# Patient Record
Sex: FEMALE | Race: WHITE | NOT HISPANIC OR LATINO | Employment: OTHER | URBAN - METROPOLITAN AREA
[De-identification: names, ages, dates, MRNs, and addresses within clinical notes are randomized per-mention and may not be internally consistent; named-entity substitution may affect disease eponyms.]

---

## 2020-12-04 ENCOUNTER — APPOINTMENT (EMERGENCY)
Dept: RADIOLOGY | Facility: HOSPITAL | Age: 85
DRG: 177 | End: 2020-12-04
Payer: MEDICARE

## 2020-12-04 ENCOUNTER — HOSPITAL ENCOUNTER (INPATIENT)
Facility: HOSPITAL | Age: 85
LOS: 4 days | Discharge: HOME HEALTH | DRG: 177 | End: 2020-12-08
Attending: EMERGENCY MEDICINE | Admitting: INTERNAL MEDICINE
Payer: MEDICARE
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
## 2020-12-04 DIAGNOSIS — U07.1 COVID-19: ICD-10-CM

## 2020-12-04 DIAGNOSIS — I10 ESSENTIAL HYPERTENSION: ICD-10-CM

## 2020-12-04 DIAGNOSIS — S80.212A ABRASION OF KNEE, BILATERAL: ICD-10-CM

## 2020-12-04 DIAGNOSIS — R26.2 AMBULATORY DYSFUNCTION: ICD-10-CM

## 2020-12-04 DIAGNOSIS — U07.1 LAB TEST POSITIVE FOR DETECTION OF COVID-19 VIRUS: ICD-10-CM

## 2020-12-04 DIAGNOSIS — S80.211A ABRASION OF KNEE, BILATERAL: ICD-10-CM

## 2020-12-04 DIAGNOSIS — R53.1 GENERALIZED WEAKNESS: Primary | ICD-10-CM

## 2020-12-04 DIAGNOSIS — G93.89 CEREBELLAR MASS: ICD-10-CM

## 2020-12-04 LAB
ALBUMIN SERPL BCP-MCNC: 4.4 G/DL (ref 3.5–5)
ALP SERPL-CCNC: 90 U/L (ref 46–116)
ALT SERPL W P-5'-P-CCNC: 28 U/L (ref 12–78)
ANION GAP SERPL CALCULATED.3IONS-SCNC: 10 MMOL/L (ref 4–13)
AST SERPL W P-5'-P-CCNC: 50 U/L (ref 5–45)
BASOPHILS # BLD AUTO: 0.03 THOUSANDS/ΜL (ref 0–0.1)
BASOPHILS NFR BLD AUTO: 0 % (ref 0–1)
BILIRUB SERPL-MCNC: 0.72 MG/DL (ref 0.2–1)
BUN SERPL-MCNC: 15 MG/DL (ref 5–25)
CALCIUM SERPL-MCNC: 8.9 MG/DL (ref 8.3–10.1)
CHLORIDE SERPL-SCNC: 99 MMOL/L (ref 100–108)
CK MB SERPL-MCNC: 4.3 NG/ML (ref 0–5)
CK MB SERPL-MCNC: <1 % (ref 0–2.5)
CK SERPL-CCNC: 874 U/L (ref 26–192)
CO2 SERPL-SCNC: 26 MMOL/L (ref 21–32)
CREAT SERPL-MCNC: 1.03 MG/DL (ref 0.6–1.3)
EOSINOPHIL # BLD AUTO: 0 THOUSAND/ΜL (ref 0–0.61)
EOSINOPHIL NFR BLD AUTO: 0 % (ref 0–6)
ERYTHROCYTE [DISTWIDTH] IN BLOOD BY AUTOMATED COUNT: 13.2 % (ref 11.6–15.1)
FLUAV RNA RESP QL NAA+PROBE: NEGATIVE
FLUBV RNA RESP QL NAA+PROBE: NEGATIVE
GFR SERPL CREATININE-BSD FRML MDRD: 48 ML/MIN/1.73SQ M
GLUCOSE SERPL-MCNC: 113 MG/DL (ref 65–140)
HCT VFR BLD AUTO: 44.7 % (ref 34.8–46.1)
HGB BLD-MCNC: 14.4 G/DL (ref 11.5–15.4)
IMM GRANULOCYTES # BLD AUTO: 0.03 THOUSAND/UL (ref 0–0.2)
IMM GRANULOCYTES NFR BLD AUTO: 0 % (ref 0–2)
LYMPHOCYTES # BLD AUTO: 0.3 THOUSANDS/ΜL (ref 0.6–4.47)
LYMPHOCYTES NFR BLD AUTO: 4 % (ref 14–44)
MCH RBC QN AUTO: 30.6 PG (ref 26.8–34.3)
MCHC RBC AUTO-ENTMCNC: 32.2 G/DL (ref 31.4–37.4)
MCV RBC AUTO: 95 FL (ref 82–98)
MONOCYTES # BLD AUTO: 0.49 THOUSAND/ΜL (ref 0.17–1.22)
MONOCYTES NFR BLD AUTO: 7 % (ref 4–12)
NEUTROPHILS # BLD AUTO: 6.16 THOUSANDS/ΜL (ref 1.85–7.62)
NEUTS SEG NFR BLD AUTO: 89 % (ref 43–75)
NRBC BLD AUTO-RTO: 0 /100 WBCS
PLATELET # BLD AUTO: 200 THOUSANDS/UL (ref 149–390)
PMV BLD AUTO: 10.1 FL (ref 8.9–12.7)
POTASSIUM SERPL-SCNC: 3.9 MMOL/L (ref 3.5–5.3)
PROT SERPL-MCNC: 8.5 G/DL (ref 6.4–8.2)
RBC # BLD AUTO: 4.71 MILLION/UL (ref 3.81–5.12)
RSV RNA RESP QL NAA+PROBE: NEGATIVE
SARS-COV-2 RNA RESP QL NAA+PROBE: POSITIVE
SODIUM SERPL-SCNC: 135 MMOL/L (ref 136–145)
WBC # BLD AUTO: 7.01 THOUSAND/UL (ref 4.31–10.16)

## 2020-12-04 PROCEDURE — 93005 ELECTROCARDIOGRAM TRACING: CPT

## 2020-12-04 PROCEDURE — 36415 COLL VENOUS BLD VENIPUNCTURE: CPT | Performed by: EMERGENCY MEDICINE

## 2020-12-04 PROCEDURE — 84484 ASSAY OF TROPONIN QUANT: CPT | Performed by: PHYSICIAN ASSISTANT

## 2020-12-04 PROCEDURE — 86900 BLOOD TYPING SEROLOGIC ABO: CPT | Performed by: PHYSICIAN ASSISTANT

## 2020-12-04 PROCEDURE — 85025 COMPLETE CBC W/AUTO DIFF WBC: CPT | Performed by: EMERGENCY MEDICINE

## 2020-12-04 PROCEDURE — 83520 IMMUNOASSAY QUANT NOS NONAB: CPT | Performed by: PHYSICIAN ASSISTANT

## 2020-12-04 PROCEDURE — 71045 X-RAY EXAM CHEST 1 VIEW: CPT

## 2020-12-04 PROCEDURE — 99285 EMERGENCY DEPT VISIT HI MDM: CPT | Performed by: EMERGENCY MEDICINE

## 2020-12-04 PROCEDURE — 86901 BLOOD TYPING SEROLOGIC RH(D): CPT | Performed by: PHYSICIAN ASSISTANT

## 2020-12-04 PROCEDURE — 82550 ASSAY OF CK (CPK): CPT | Performed by: EMERGENCY MEDICINE

## 2020-12-04 PROCEDURE — 80053 COMPREHEN METABOLIC PANEL: CPT | Performed by: EMERGENCY MEDICINE

## 2020-12-04 PROCEDURE — 99285 EMERGENCY DEPT VISIT HI MDM: CPT

## 2020-12-04 PROCEDURE — 82553 CREATINE MB FRACTION: CPT | Performed by: EMERGENCY MEDICINE

## 2020-12-04 PROCEDURE — 0241U HB NFCT DS VIR RESP RNA 4 TRGT: CPT | Performed by: EMERGENCY MEDICINE

## 2020-12-04 PROCEDURE — 85049 AUTOMATED PLATELET COUNT: CPT | Performed by: PHYSICIAN ASSISTANT

## 2020-12-04 PROCEDURE — 99222 1ST HOSP IP/OBS MODERATE 55: CPT | Performed by: PHYSICIAN ASSISTANT

## 2020-12-04 RX ORDER — MULTIVIT-MIN/FERROUS GLUCONATE 9 MG/15 ML
15 LIQUID (ML) ORAL DAILY
Status: DISCONTINUED | OUTPATIENT
Start: 2020-12-12 | End: 2020-12-08 | Stop reason: HOSPADM

## 2020-12-04 RX ORDER — SODIUM CHLORIDE 9 MG/ML
75 INJECTION, SOLUTION INTRAVENOUS CONTINUOUS
Status: DISCONTINUED | OUTPATIENT
Start: 2020-12-04 | End: 2020-12-06

## 2020-12-04 RX ORDER — MELATONIN
2000 DAILY
Status: DISCONTINUED | OUTPATIENT
Start: 2020-12-05 | End: 2020-12-08 | Stop reason: HOSPADM

## 2020-12-04 RX ORDER — ACETAMINOPHEN 325 MG/1
650 TABLET ORAL EVERY 6 HOURS PRN
Status: DISCONTINUED | OUTPATIENT
Start: 2020-12-04 | End: 2020-12-08 | Stop reason: HOSPADM

## 2020-12-04 RX ORDER — ASCORBIC ACID 500 MG
1000 TABLET ORAL EVERY 12 HOURS SCHEDULED
Status: DISCONTINUED | OUTPATIENT
Start: 2020-12-04 | End: 2020-12-08 | Stop reason: HOSPADM

## 2020-12-04 RX ORDER — BENZONATATE 100 MG/1
100 CAPSULE ORAL ONCE
Status: COMPLETED | OUTPATIENT
Start: 2020-12-04 | End: 2020-12-04

## 2020-12-04 RX ORDER — SODIUM CHLORIDE, SODIUM LACTATE, POTASSIUM CHLORIDE, CALCIUM CHLORIDE 600; 310; 30; 20 MG/100ML; MG/100ML; MG/100ML; MG/100ML
150 INJECTION, SOLUTION INTRAVENOUS CONTINUOUS
Status: DISCONTINUED | OUTPATIENT
Start: 2020-12-04 | End: 2020-12-04

## 2020-12-04 RX ORDER — CALCIUM CARBONATE 200(500)MG
1000 TABLET,CHEWABLE ORAL DAILY PRN
Status: DISCONTINUED | OUTPATIENT
Start: 2020-12-04 | End: 2020-12-08 | Stop reason: HOSPADM

## 2020-12-04 RX ORDER — ZINC SULFATE 50(220)MG
220 CAPSULE ORAL DAILY
Status: DISCONTINUED | OUTPATIENT
Start: 2020-12-05 | End: 2020-12-08 | Stop reason: HOSPADM

## 2020-12-04 RX ADMIN — BENZONATATE 100 MG: 100 CAPSULE ORAL at 23:44

## 2020-12-04 RX ADMIN — SODIUM CHLORIDE 75 ML/HR: 0.9 INJECTION, SOLUTION INTRAVENOUS at 23:44

## 2020-12-05 ENCOUNTER — APPOINTMENT (INPATIENT)
Dept: CT IMAGING | Facility: HOSPITAL | Age: 85
DRG: 177 | End: 2020-12-05
Payer: MEDICARE

## 2020-12-05 LAB
ABO GROUP BLD: NORMAL
ALBUMIN SERPL BCP-MCNC: 3.7 G/DL (ref 3.5–5)
ALP SERPL-CCNC: 77 U/L (ref 46–116)
ALT SERPL W P-5'-P-CCNC: 27 U/L (ref 12–78)
ANION GAP SERPL CALCULATED.3IONS-SCNC: 8 MMOL/L (ref 4–13)
AST SERPL W P-5'-P-CCNC: 45 U/L (ref 5–45)
BASOPHILS # BLD AUTO: 0.01 THOUSANDS/ΜL (ref 0–0.1)
BASOPHILS NFR BLD AUTO: 0 % (ref 0–1)
BILIRUB SERPL-MCNC: 0.67 MG/DL (ref 0.2–1)
BUN SERPL-MCNC: 16 MG/DL (ref 5–25)
CALCIUM SERPL-MCNC: 8.4 MG/DL (ref 8.3–10.1)
CHLORIDE SERPL-SCNC: 102 MMOL/L (ref 100–108)
CK MB SERPL-MCNC: 4.8 NG/ML (ref 0–5)
CK MB SERPL-MCNC: <1 % (ref 0–2.5)
CK SERPL-CCNC: 654 U/L (ref 26–192)
CO2 SERPL-SCNC: 27 MMOL/L (ref 21–32)
CREAT SERPL-MCNC: 1.03 MG/DL (ref 0.6–1.3)
CRP SERPL QL: 32.2 MG/L
D DIMER PPP FEU-MCNC: 1.25 UG/ML FEU
EOSINOPHIL # BLD AUTO: 0.02 THOUSAND/ΜL (ref 0–0.61)
EOSINOPHIL NFR BLD AUTO: 0 % (ref 0–6)
ERYTHROCYTE [DISTWIDTH] IN BLOOD BY AUTOMATED COUNT: 13.4 % (ref 11.6–15.1)
FERRITIN SERPL-MCNC: 273 NG/ML (ref 8–388)
GFR SERPL CREATININE-BSD FRML MDRD: 48 ML/MIN/1.73SQ M
GLUCOSE SERPL-MCNC: 110 MG/DL (ref 65–140)
HCT VFR BLD AUTO: 41.1 % (ref 34.8–46.1)
HGB BLD-MCNC: 13.1 G/DL (ref 11.5–15.4)
IMM GRANULOCYTES # BLD AUTO: 0.01 THOUSAND/UL (ref 0–0.2)
IMM GRANULOCYTES NFR BLD AUTO: 0 % (ref 0–2)
LYMPHOCYTES # BLD AUTO: 0.69 THOUSANDS/ΜL (ref 0.6–4.47)
LYMPHOCYTES NFR BLD AUTO: 11 % (ref 14–44)
MCH RBC QN AUTO: 30.2 PG (ref 26.8–34.3)
MCHC RBC AUTO-ENTMCNC: 31.9 G/DL (ref 31.4–37.4)
MCV RBC AUTO: 95 FL (ref 82–98)
MONOCYTES # BLD AUTO: 0.78 THOUSAND/ΜL (ref 0.17–1.22)
MONOCYTES NFR BLD AUTO: 13 % (ref 4–12)
NEUTROPHILS # BLD AUTO: 4.56 THOUSANDS/ΜL (ref 1.85–7.62)
NEUTS SEG NFR BLD AUTO: 76 % (ref 43–75)
NRBC BLD AUTO-RTO: 0 /100 WBCS
PLATELET # BLD AUTO: 178 THOUSANDS/UL (ref 149–390)
PLATELET # BLD AUTO: 186 THOUSANDS/UL (ref 149–390)
PMV BLD AUTO: 9.4 FL (ref 8.9–12.7)
PMV BLD AUTO: 9.7 FL (ref 8.9–12.7)
POTASSIUM SERPL-SCNC: 3.7 MMOL/L (ref 3.5–5.3)
PROT SERPL-MCNC: 7.2 G/DL (ref 6.4–8.2)
RBC # BLD AUTO: 4.34 MILLION/UL (ref 3.81–5.12)
RH BLD: POSITIVE
SODIUM SERPL-SCNC: 137 MMOL/L (ref 136–145)
TROPONIN I SERPL-MCNC: 0.04 NG/ML
TROPONIN I SERPL-MCNC: 0.05 NG/ML
TROPONIN I SERPL-MCNC: 0.05 NG/ML
WBC # BLD AUTO: 6.07 THOUSAND/UL (ref 4.31–10.16)

## 2020-12-05 PROCEDURE — G1004 CDSM NDSC: HCPCS

## 2020-12-05 PROCEDURE — 71260 CT THORAX DX C+: CPT

## 2020-12-05 PROCEDURE — 85025 COMPLETE CBC W/AUTO DIFF WBC: CPT | Performed by: PHYSICIAN ASSISTANT

## 2020-12-05 PROCEDURE — 99232 SBSQ HOSP IP/OBS MODERATE 35: CPT | Performed by: INTERNAL MEDICINE

## 2020-12-05 PROCEDURE — 74177 CT ABD & PELVIS W/CONTRAST: CPT

## 2020-12-05 PROCEDURE — 70470 CT HEAD/BRAIN W/O & W/DYE: CPT

## 2020-12-05 PROCEDURE — 86140 C-REACTIVE PROTEIN: CPT | Performed by: PHYSICIAN ASSISTANT

## 2020-12-05 PROCEDURE — 85379 FIBRIN DEGRADATION QUANT: CPT | Performed by: PHYSICIAN ASSISTANT

## 2020-12-05 PROCEDURE — 70450 CT HEAD/BRAIN W/O DYE: CPT

## 2020-12-05 PROCEDURE — 80053 COMPREHEN METABOLIC PANEL: CPT | Performed by: PHYSICIAN ASSISTANT

## 2020-12-05 PROCEDURE — NC001 PR NO CHARGE: Performed by: PHYSICIAN ASSISTANT

## 2020-12-05 PROCEDURE — 82550 ASSAY OF CK (CPK): CPT | Performed by: PHYSICIAN ASSISTANT

## 2020-12-05 PROCEDURE — 84484 ASSAY OF TROPONIN QUANT: CPT | Performed by: PHYSICIAN ASSISTANT

## 2020-12-05 PROCEDURE — 36415 COLL VENOUS BLD VENIPUNCTURE: CPT | Performed by: PHYSICIAN ASSISTANT

## 2020-12-05 PROCEDURE — 82553 CREATINE MB FRACTION: CPT | Performed by: PHYSICIAN ASSISTANT

## 2020-12-05 PROCEDURE — 82728 ASSAY OF FERRITIN: CPT | Performed by: PHYSICIAN ASSISTANT

## 2020-12-05 RX ORDER — ATORVASTATIN CALCIUM 40 MG/1
40 TABLET, FILM COATED ORAL EVERY EVENING
Status: DISCONTINUED | OUTPATIENT
Start: 2020-12-05 | End: 2020-12-08 | Stop reason: HOSPADM

## 2020-12-05 RX ORDER — DEXAMETHASONE SODIUM PHOSPHATE 4 MG/ML
4 INJECTION, SOLUTION INTRA-ARTICULAR; INTRALESIONAL; INTRAMUSCULAR; INTRAVENOUS; SOFT TISSUE EVERY 6 HOURS SCHEDULED
Status: DISCONTINUED | OUTPATIENT
Start: 2020-12-06 | End: 2020-12-08 | Stop reason: HOSPADM

## 2020-12-05 RX ORDER — DEXAMETHASONE SODIUM PHOSPHATE 10 MG/ML
10 INJECTION, SOLUTION INTRAMUSCULAR; INTRAVENOUS ONCE
Status: COMPLETED | OUTPATIENT
Start: 2020-12-05 | End: 2020-12-05

## 2020-12-05 RX ORDER — ASPIRIN 81 MG/1
81 TABLET, CHEWABLE ORAL DAILY
Status: DISCONTINUED | OUTPATIENT
Start: 2020-12-05 | End: 2020-12-08 | Stop reason: HOSPADM

## 2020-12-05 RX ORDER — DEXAMETHASONE SODIUM PHOSPHATE 4 MG/ML
8 INJECTION, SOLUTION INTRA-ARTICULAR; INTRALESIONAL; INTRAMUSCULAR; INTRAVENOUS; SOFT TISSUE EVERY 8 HOURS SCHEDULED
Status: DISCONTINUED | OUTPATIENT
Start: 2020-12-05 | End: 2020-12-05

## 2020-12-05 RX ADMIN — DEXAMETHASONE SODIUM PHOSPHATE 10 MG: 10 INJECTION, SOLUTION INTRAMUSCULAR; INTRAVENOUS at 22:36

## 2020-12-05 RX ADMIN — ZINC SULFATE 220 MG (50 MG) CAPSULE 220 MG: CAPSULE at 11:08

## 2020-12-05 RX ADMIN — Medication 1000 MG: at 03:25

## 2020-12-05 RX ADMIN — Medication 1000 MG: at 20:12

## 2020-12-05 RX ADMIN — Medication 2000 UNITS: at 11:06

## 2020-12-05 RX ADMIN — IOHEXOL 100 ML: 350 INJECTION, SOLUTION INTRAVENOUS at 20:45

## 2020-12-05 RX ADMIN — ATORVASTATIN CALCIUM 40 MG: 40 TABLET, FILM COATED ORAL at 18:51

## 2020-12-05 RX ADMIN — Medication 1000 MG: at 11:09

## 2020-12-05 RX ADMIN — ASPIRIN 81 MG CHEWABLE TABLET 81 MG: 81 TABLET CHEWABLE at 18:51

## 2020-12-06 PROBLEM — G93.89 CEREBELLAR MASS: Status: ACTIVE | Noted: 2020-12-06

## 2020-12-06 LAB
ATRIAL RATE: 87 BPM
CHOLEST SERPL-MCNC: 217 MG/DL (ref 50–200)
EST. AVERAGE GLUCOSE BLD GHB EST-MCNC: 126 MG/DL
HBA1C MFR BLD: 6 %
HDLC SERPL-MCNC: 56 MG/DL
LDLC SERPL CALC-MCNC: 143 MG/DL (ref 0–100)
P AXIS: 68 DEGREES
PR INTERVAL: 152 MS
QRS AXIS: 45 DEGREES
QRSD INTERVAL: 78 MS
QT INTERVAL: 368 MS
QTC INTERVAL: 442 MS
T WAVE AXIS: 57 DEGREES
TRIGL SERPL-MCNC: 89 MG/DL
VENTRICULAR RATE: 87 BPM

## 2020-12-06 PROCEDURE — 97163 PT EVAL HIGH COMPLEX 45 MIN: CPT

## 2020-12-06 PROCEDURE — 93010 ELECTROCARDIOGRAM REPORT: CPT | Performed by: INTERNAL MEDICINE

## 2020-12-06 PROCEDURE — 99232 SBSQ HOSP IP/OBS MODERATE 35: CPT | Performed by: PHYSICIAN ASSISTANT

## 2020-12-06 PROCEDURE — 83036 HEMOGLOBIN GLYCOSYLATED A1C: CPT | Performed by: INTERNAL MEDICINE

## 2020-12-06 PROCEDURE — 80061 LIPID PANEL: CPT | Performed by: INTERNAL MEDICINE

## 2020-12-06 PROCEDURE — 97116 GAIT TRAINING THERAPY: CPT

## 2020-12-06 RX ADMIN — Medication 1000 MG: at 08:12

## 2020-12-06 RX ADMIN — DEXAMETHASONE SODIUM PHOSPHATE 4 MG: 4 INJECTION, SOLUTION INTRAMUSCULAR; INTRAVENOUS at 16:00

## 2020-12-06 RX ADMIN — DEXAMETHASONE SODIUM PHOSPHATE 4 MG: 4 INJECTION, SOLUTION INTRAMUSCULAR; INTRAVENOUS at 08:12

## 2020-12-06 RX ADMIN — ATORVASTATIN CALCIUM 40 MG: 40 TABLET, FILM COATED ORAL at 17:27

## 2020-12-06 RX ADMIN — DEXAMETHASONE SODIUM PHOSPHATE 4 MG: 4 INJECTION, SOLUTION INTRAMUSCULAR; INTRAVENOUS at 21:24

## 2020-12-06 RX ADMIN — DEXAMETHASONE SODIUM PHOSPHATE 4 MG: 4 INJECTION, SOLUTION INTRAMUSCULAR; INTRAVENOUS at 04:21

## 2020-12-06 RX ADMIN — Medication 2000 UNITS: at 08:12

## 2020-12-06 RX ADMIN — ASPIRIN 81 MG CHEWABLE TABLET 81 MG: 81 TABLET CHEWABLE at 08:12

## 2020-12-06 RX ADMIN — Medication 1000 MG: at 21:24

## 2020-12-06 RX ADMIN — ZINC SULFATE 220 MG (50 MG) CAPSULE 220 MG: CAPSULE at 08:12

## 2020-12-07 LAB
BASOPHILS # BLD AUTO: 0 THOUSANDS/ΜL (ref 0–0.1)
BASOPHILS NFR BLD AUTO: 0 % (ref 0–1)
EOSINOPHIL # BLD AUTO: 0 THOUSAND/ΜL (ref 0–0.61)
EOSINOPHIL NFR BLD AUTO: 0 % (ref 0–6)
ERYTHROCYTE [DISTWIDTH] IN BLOOD BY AUTOMATED COUNT: 13.2 % (ref 11.6–15.1)
HCT VFR BLD AUTO: 40.9 % (ref 34.8–46.1)
HGB BLD-MCNC: 13.2 G/DL (ref 11.5–15.4)
IMM GRANULOCYTES # BLD AUTO: 0.02 THOUSAND/UL (ref 0–0.2)
IMM GRANULOCYTES NFR BLD AUTO: 0 % (ref 0–2)
LYMPHOCYTES # BLD AUTO: 0.62 THOUSANDS/ΜL (ref 0.6–4.47)
LYMPHOCYTES NFR BLD AUTO: 11 % (ref 14–44)
MCH RBC QN AUTO: 30.4 PG (ref 26.8–34.3)
MCHC RBC AUTO-ENTMCNC: 32.3 G/DL (ref 31.4–37.4)
MCV RBC AUTO: 94 FL (ref 82–98)
MONOCYTES # BLD AUTO: 0.2 THOUSAND/ΜL (ref 0.17–1.22)
MONOCYTES NFR BLD AUTO: 4 % (ref 4–12)
NEUTROPHILS # BLD AUTO: 4.59 THOUSANDS/ΜL (ref 1.85–7.62)
NEUTS SEG NFR BLD AUTO: 85 % (ref 43–75)
NRBC BLD AUTO-RTO: 0 /100 WBCS
PLATELET # BLD AUTO: 200 THOUSANDS/UL (ref 149–390)
PMV BLD AUTO: 10 FL (ref 8.9–12.7)
RBC # BLD AUTO: 4.34 MILLION/UL (ref 3.81–5.12)
WBC # BLD AUTO: 5.43 THOUSAND/UL (ref 4.31–10.16)

## 2020-12-07 PROCEDURE — 85025 COMPLETE CBC W/AUTO DIFF WBC: CPT | Performed by: PHYSICIAN ASSISTANT

## 2020-12-07 PROCEDURE — 99232 SBSQ HOSP IP/OBS MODERATE 35: CPT | Performed by: PHYSICIAN ASSISTANT

## 2020-12-07 RX ADMIN — ZINC SULFATE 220 MG (50 MG) CAPSULE 220 MG: CAPSULE at 08:37

## 2020-12-07 RX ADMIN — ASPIRIN 81 MG CHEWABLE TABLET 81 MG: 81 TABLET CHEWABLE at 08:36

## 2020-12-07 RX ADMIN — DEXAMETHASONE SODIUM PHOSPHATE 4 MG: 4 INJECTION, SOLUTION INTRAMUSCULAR; INTRAVENOUS at 19:35

## 2020-12-07 RX ADMIN — ATORVASTATIN CALCIUM 40 MG: 40 TABLET, FILM COATED ORAL at 18:35

## 2020-12-07 RX ADMIN — Medication 1000 MG: at 22:18

## 2020-12-07 RX ADMIN — DEXAMETHASONE SODIUM PHOSPHATE 4 MG: 4 INJECTION, SOLUTION INTRAMUSCULAR; INTRAVENOUS at 01:43

## 2020-12-07 RX ADMIN — Medication 2000 UNITS: at 08:37

## 2020-12-07 RX ADMIN — DEXAMETHASONE SODIUM PHOSPHATE 4 MG: 4 INJECTION, SOLUTION INTRAMUSCULAR; INTRAVENOUS at 15:30

## 2020-12-07 RX ADMIN — DEXAMETHASONE SODIUM PHOSPHATE 4 MG: 4 INJECTION, SOLUTION INTRAMUSCULAR; INTRAVENOUS at 08:37

## 2020-12-07 RX ADMIN — Medication 1000 MG: at 08:37

## 2020-12-08 VITALS
RESPIRATION RATE: 18 BRPM | BODY MASS INDEX: 37.43 KG/M2 | TEMPERATURE: 97.7 F | WEIGHT: 224.65 LBS | HEART RATE: 74 BPM | HEIGHT: 65 IN | DIASTOLIC BLOOD PRESSURE: 77 MMHG | OXYGEN SATURATION: 93 % | SYSTOLIC BLOOD PRESSURE: 143 MMHG

## 2020-12-08 PROCEDURE — 99239 HOSP IP/OBS DSCHRG MGMT >30: CPT | Performed by: PHYSICIAN ASSISTANT

## 2020-12-08 PROCEDURE — 99223 1ST HOSP IP/OBS HIGH 75: CPT | Performed by: PHYSICIAN ASSISTANT

## 2020-12-08 RX ORDER — AMLODIPINE BESYLATE 5 MG/1
5 TABLET ORAL DAILY
Qty: 30 TABLET | Refills: 0 | Status: SHIPPED | OUTPATIENT
Start: 2020-12-09

## 2020-12-08 RX ORDER — DEXAMETHASONE 4 MG/1
TABLET ORAL
Qty: 66 TABLET | Refills: 0 | Status: SHIPPED | OUTPATIENT
Start: 2020-12-08 | End: 2020-12-30 | Stop reason: HOSPADM

## 2020-12-08 RX ORDER — AMLODIPINE BESYLATE 5 MG/1
5 TABLET ORAL DAILY
Status: DISCONTINUED | OUTPATIENT
Start: 2020-12-08 | End: 2020-12-08 | Stop reason: HOSPADM

## 2020-12-08 RX ORDER — MELATONIN
2000 DAILY
Qty: 26 TABLET | Refills: 0
Start: 2020-12-09

## 2020-12-08 RX ADMIN — Medication 2000 UNITS: at 09:33

## 2020-12-08 RX ADMIN — Medication 1000 MG: at 09:33

## 2020-12-08 RX ADMIN — DEXAMETHASONE SODIUM PHOSPHATE 4 MG: 4 INJECTION, SOLUTION INTRAMUSCULAR; INTRAVENOUS at 09:33

## 2020-12-08 RX ADMIN — ZINC SULFATE 220 MG (50 MG) CAPSULE 220 MG: CAPSULE at 09:33

## 2020-12-08 RX ADMIN — AMLODIPINE BESYLATE 5 MG: 5 TABLET ORAL at 11:22

## 2020-12-08 RX ADMIN — DEXAMETHASONE SODIUM PHOSPHATE 4 MG: 4 INJECTION, SOLUTION INTRAMUSCULAR; INTRAVENOUS at 04:04

## 2020-12-08 RX ADMIN — ASPIRIN 81 MG CHEWABLE TABLET 81 MG: 81 TABLET CHEWABLE at 09:33

## 2020-12-08 RX ADMIN — ATORVASTATIN CALCIUM 40 MG: 40 TABLET, FILM COATED ORAL at 16:27

## 2020-12-08 RX ADMIN — DEXAMETHASONE SODIUM PHOSPHATE 4 MG: 4 INJECTION, SOLUTION INTRAMUSCULAR; INTRAVENOUS at 14:15

## 2020-12-09 LAB — IL6 SERPL-MCNC: 49.2 PG/ML (ref 0–13)

## 2020-12-27 ENCOUNTER — HOSPITAL ENCOUNTER (INPATIENT)
Facility: HOSPITAL | Age: 85
LOS: 3 days | Discharge: NURSING, REHAB OR LONG-TERM CARE | DRG: 689 | End: 2020-12-30
Attending: EMERGENCY MEDICINE | Admitting: INTERNAL MEDICINE
Payer: MEDICARE

## 2020-12-27 ENCOUNTER — APPOINTMENT (EMERGENCY)
Dept: RADIOLOGY | Facility: HOSPITAL | Age: 85
DRG: 689 | End: 2020-12-27
Payer: MEDICARE

## 2020-12-27 ENCOUNTER — APPOINTMENT (EMERGENCY)
Dept: CT IMAGING | Facility: HOSPITAL | Age: 85
DRG: 689 | End: 2020-12-27
Payer: MEDICARE

## 2020-12-27 DIAGNOSIS — N12 PYELONEPHRITIS: ICD-10-CM

## 2020-12-27 DIAGNOSIS — G93.6 VASOGENIC EDEMA (HCC): ICD-10-CM

## 2020-12-27 DIAGNOSIS — N10 ACUTE PYELONEPHRITIS: ICD-10-CM

## 2020-12-27 DIAGNOSIS — R53.1 WEAKNESS: Primary | ICD-10-CM

## 2020-12-27 PROBLEM — E87.1 HYPONATREMIA: Status: ACTIVE | Noted: 2020-12-27

## 2020-12-27 PROBLEM — Z86.16 HISTORY OF 2019 NOVEL CORONAVIRUS DISEASE (COVID-19): Status: ACTIVE | Noted: 2020-12-27

## 2020-12-27 LAB
ALBUMIN SERPL BCP-MCNC: 2.8 G/DL (ref 3.5–5)
ALP SERPL-CCNC: 79 U/L (ref 46–116)
ALT SERPL W P-5'-P-CCNC: 56 U/L (ref 12–78)
ANION GAP SERPL CALCULATED.3IONS-SCNC: 11 MMOL/L (ref 4–13)
AST SERPL W P-5'-P-CCNC: 21 U/L (ref 5–45)
ATRIAL RATE: 78 BPM
BACTERIA UR QL AUTO: ABNORMAL /HPF
BASOPHILS # BLD AUTO: 0.02 THOUSANDS/ΜL (ref 0–0.1)
BASOPHILS NFR BLD AUTO: 0 % (ref 0–1)
BILIRUB SERPL-MCNC: 1.63 MG/DL (ref 0.2–1)
BILIRUB UR QL STRIP: NEGATIVE
BUN SERPL-MCNC: 34 MG/DL (ref 5–25)
CALCIUM ALBUM COR SERPL-MCNC: 9.6 MG/DL (ref 8.3–10.1)
CALCIUM SERPL-MCNC: 8.6 MG/DL (ref 8.3–10.1)
CHLORIDE SERPL-SCNC: 99 MMOL/L (ref 100–108)
CLARITY UR: ABNORMAL
CO2 SERPL-SCNC: 25 MMOL/L (ref 21–32)
COLOR UR: YELLOW
CREAT SERPL-MCNC: 0.89 MG/DL (ref 0.6–1.3)
EOSINOPHIL # BLD AUTO: 0.02 THOUSAND/ΜL (ref 0–0.61)
EOSINOPHIL NFR BLD AUTO: 0 % (ref 0–6)
ERYTHROCYTE [DISTWIDTH] IN BLOOD BY AUTOMATED COUNT: 12.5 % (ref 11.6–15.1)
GFR SERPL CREATININE-BSD FRML MDRD: 57 ML/MIN/1.73SQ M
GLUCOSE SERPL-MCNC: 231 MG/DL (ref 65–140)
GLUCOSE UR STRIP-MCNC: ABNORMAL MG/DL
HCT VFR BLD AUTO: 43.1 % (ref 34.8–46.1)
HGB BLD-MCNC: 14.3 G/DL (ref 11.5–15.4)
HGB UR QL STRIP.AUTO: ABNORMAL
IMM GRANULOCYTES # BLD AUTO: 0.18 THOUSAND/UL (ref 0–0.2)
IMM GRANULOCYTES NFR BLD AUTO: 1 % (ref 0–2)
KETONES UR STRIP-MCNC: NEGATIVE MG/DL
LEUKOCYTE ESTERASE UR QL STRIP: ABNORMAL
LYMPHOCYTES # BLD AUTO: 0.75 THOUSANDS/ΜL (ref 0.6–4.47)
LYMPHOCYTES NFR BLD AUTO: 5 % (ref 14–44)
MAGNESIUM SERPL-MCNC: 1.8 MG/DL (ref 1.6–2.6)
MCH RBC QN AUTO: 29.7 PG (ref 26.8–34.3)
MCHC RBC AUTO-ENTMCNC: 33.2 G/DL (ref 31.4–37.4)
MCV RBC AUTO: 90 FL (ref 82–98)
MONOCYTES # BLD AUTO: 0.75 THOUSAND/ΜL (ref 0.17–1.22)
MONOCYTES NFR BLD AUTO: 5 % (ref 4–12)
NEUTROPHILS # BLD AUTO: 12.59 THOUSANDS/ΜL (ref 1.85–7.62)
NEUTS SEG NFR BLD AUTO: 89 % (ref 43–75)
NITRITE UR QL STRIP: NEGATIVE
NON-SQ EPI CELLS URNS QL MICRO: ABNORMAL /HPF
NRBC BLD AUTO-RTO: 0 /100 WBCS
NT-PROBNP SERPL-MCNC: 324 PG/ML
P AXIS: 38 DEGREES
PH UR STRIP.AUTO: 5.5 [PH]
PLATELET # BLD AUTO: 92 THOUSANDS/UL (ref 149–390)
PMV BLD AUTO: 9.7 FL (ref 8.9–12.7)
POTASSIUM SERPL-SCNC: 3.8 MMOL/L (ref 3.5–5.3)
PROT SERPL-MCNC: 6.1 G/DL (ref 6.4–8.2)
PROT UR STRIP-MCNC: ABNORMAL MG/DL
QRS AXIS: 30 DEGREES
QRSD INTERVAL: 68 MS
QT INTERVAL: 342 MS
QTC INTERVAL: 413 MS
RBC # BLD AUTO: 4.81 MILLION/UL (ref 3.81–5.12)
RBC #/AREA URNS AUTO: ABNORMAL /HPF
SODIUM SERPL-SCNC: 135 MMOL/L (ref 136–145)
SP GR UR STRIP.AUTO: 1.02 (ref 1–1.03)
T WAVE AXIS: 52 DEGREES
TROPONIN I SERPL-MCNC: 0.03 NG/ML
UROBILINOGEN UR QL STRIP.AUTO: 0.2 E.U./DL
VENTRICULAR RATE: 88 BPM
WBC # BLD AUTO: 14.31 THOUSAND/UL (ref 4.31–10.16)
WBC #/AREA URNS AUTO: ABNORMAL /HPF

## 2020-12-27 PROCEDURE — 99285 EMERGENCY DEPT VISIT HI MDM: CPT | Performed by: EMERGENCY MEDICINE

## 2020-12-27 PROCEDURE — 93005 ELECTROCARDIOGRAM TRACING: CPT

## 2020-12-27 PROCEDURE — 83735 ASSAY OF MAGNESIUM: CPT | Performed by: EMERGENCY MEDICINE

## 2020-12-27 PROCEDURE — 1124F ACP DISCUSS-NO DSCNMKR DOCD: CPT | Performed by: EMERGENCY MEDICINE

## 2020-12-27 PROCEDURE — 36415 COLL VENOUS BLD VENIPUNCTURE: CPT | Performed by: EMERGENCY MEDICINE

## 2020-12-27 PROCEDURE — 99285 EMERGENCY DEPT VISIT HI MDM: CPT

## 2020-12-27 PROCEDURE — 81001 URINALYSIS AUTO W/SCOPE: CPT | Performed by: EMERGENCY MEDICINE

## 2020-12-27 PROCEDURE — 93010 ELECTROCARDIOGRAM REPORT: CPT | Performed by: INTERNAL MEDICINE

## 2020-12-27 PROCEDURE — 80053 COMPREHEN METABOLIC PANEL: CPT | Performed by: EMERGENCY MEDICINE

## 2020-12-27 PROCEDURE — 71045 X-RAY EXAM CHEST 1 VIEW: CPT

## 2020-12-27 PROCEDURE — 87086 URINE CULTURE/COLONY COUNT: CPT | Performed by: EMERGENCY MEDICINE

## 2020-12-27 PROCEDURE — 99223 1ST HOSP IP/OBS HIGH 75: CPT | Performed by: INTERNAL MEDICINE

## 2020-12-27 PROCEDURE — 85025 COMPLETE CBC W/AUTO DIFF WBC: CPT | Performed by: EMERGENCY MEDICINE

## 2020-12-27 PROCEDURE — 74177 CT ABD & PELVIS W/CONTRAST: CPT

## 2020-12-27 PROCEDURE — 84484 ASSAY OF TROPONIN QUANT: CPT | Performed by: EMERGENCY MEDICINE

## 2020-12-27 PROCEDURE — 96365 THER/PROPH/DIAG IV INF INIT: CPT

## 2020-12-27 PROCEDURE — 73630 X-RAY EXAM OF FOOT: CPT

## 2020-12-27 PROCEDURE — 83880 ASSAY OF NATRIURETIC PEPTIDE: CPT | Performed by: EMERGENCY MEDICINE

## 2020-12-27 PROCEDURE — 70450 CT HEAD/BRAIN W/O DYE: CPT

## 2020-12-27 RX ORDER — DEXAMETHASONE SODIUM PHOSPHATE 10 MG/ML
10 INJECTION, SOLUTION INTRAMUSCULAR; INTRAVENOUS ONCE
Status: COMPLETED | OUTPATIENT
Start: 2020-12-27 | End: 2020-12-27

## 2020-12-27 RX ORDER — DEXAMETHASONE SODIUM PHOSPHATE 4 MG/ML
4 INJECTION, SOLUTION INTRA-ARTICULAR; INTRALESIONAL; INTRAMUSCULAR; INTRAVENOUS; SOFT TISSUE EVERY 12 HOURS SCHEDULED
Status: DISCONTINUED | OUTPATIENT
Start: 2020-12-28 | End: 2020-12-30 | Stop reason: HOSPADM

## 2020-12-27 RX ORDER — ONDANSETRON 2 MG/ML
4 INJECTION INTRAMUSCULAR; INTRAVENOUS EVERY 6 HOURS PRN
Status: DISCONTINUED | OUTPATIENT
Start: 2020-12-27 | End: 2020-12-30 | Stop reason: HOSPADM

## 2020-12-27 RX ORDER — SODIUM CHLORIDE, SODIUM GLUCONATE, SODIUM ACETATE, POTASSIUM CHLORIDE, MAGNESIUM CHLORIDE, SODIUM PHOSPHATE, DIBASIC, AND POTASSIUM PHOSPHATE .53; .5; .37; .037; .03; .012; .00082 G/100ML; G/100ML; G/100ML; G/100ML; G/100ML; G/100ML; G/100ML
75 INJECTION, SOLUTION INTRAVENOUS CONTINUOUS
Status: DISCONTINUED | OUTPATIENT
Start: 2020-12-27 | End: 2020-12-29

## 2020-12-27 RX ORDER — SODIUM CHLORIDE 9 MG/ML
75 INJECTION, SOLUTION INTRAVENOUS CONTINUOUS
Status: DISCONTINUED | OUTPATIENT
Start: 2020-12-27 | End: 2020-12-27

## 2020-12-27 RX ORDER — NYSTATIN 100000 U/G
OINTMENT TOPICAL ONCE
Status: COMPLETED | OUTPATIENT
Start: 2020-12-27 | End: 2020-12-27

## 2020-12-27 RX ORDER — ACETAMINOPHEN 325 MG/1
650 TABLET ORAL EVERY 6 HOURS PRN
Status: DISCONTINUED | OUTPATIENT
Start: 2020-12-27 | End: 2020-12-30 | Stop reason: HOSPADM

## 2020-12-27 RX ADMIN — CEFTRIAXONE SODIUM 1000 MG: 10 INJECTION, POWDER, FOR SOLUTION INTRAVENOUS at 15:25

## 2020-12-27 RX ADMIN — SODIUM CHLORIDE, SODIUM GLUCONATE, SODIUM ACETATE, POTASSIUM CHLORIDE, MAGNESIUM CHLORIDE, SODIUM PHOSPHATE, DIBASIC, AND POTASSIUM PHOSPHATE 75 ML/HR: .53; .5; .37; .037; .03; .012; .00082 INJECTION, SOLUTION INTRAVENOUS at 18:41

## 2020-12-27 RX ADMIN — NYSTATIN: 100000 OINTMENT TOPICAL at 15:09

## 2020-12-27 RX ADMIN — IOHEXOL 100 ML: 350 INJECTION, SOLUTION INTRAVENOUS at 15:01

## 2020-12-27 RX ADMIN — DEXAMETHASONE SODIUM PHOSPHATE 10 MG: 10 INJECTION, SOLUTION INTRAMUSCULAR; INTRAVENOUS at 19:45

## 2020-12-27 NOTE — ASSESSMENT & PLAN NOTE
Difficulty ambulating at home and performing ADLs  This is also potentially exacerbated by acute infection  Patient's family requesting that she be placed in nursing home given her inability to care for herself  Currently uses walker at home but is unable to pull herself up to even ambulate        Plan:  · PT/OT evaluation and treat  · Case management consulted for disposition planning

## 2020-12-27 NOTE — ASSESSMENT & PLAN NOTE
Recent Labs     12/27/20  1309   SODIUM 135*     Mild hyponatremia present on admission  Likely secondary to poor p o  Intake at home       Plan:  · Will start on Plasmalyte at 75 ml/hour  · Repeat CMP in the morning

## 2020-12-27 NOTE — H&P
H&P- Tennis Cici 10/21/1930, 80 y o  female MRN: [id number]    Unit/Bed#: S -01 Encounter: [id number]    Primary Care Provider: Everardo Arenas MD   Date and time admitted to hospital: 12/27/2020 12:52 PM        * Acute pyelonephritis  Assessment & Plan  Results from last 7 days   Lab Units 12/27/20  1415   LEUKOCYTES UA  Moderate*   NITRITE UA  Negative   GLUCOSE UA mg/dl 250 (1/4%)*   KETONES UA mg/dl Negative   BLOOD UA  Large*   WBC UA /hpf Innumerable*   RBC UA /hpf 10-20*   BACTERIA UA /hpf Moderate*     Recent Labs     12/27/20  1309   WBC 14 31*       Ct Abdomen Pelvis With Contrast    Result Date: 12/27/2020  Impression: New 3 1 x 3 5 cm hypodense area upper pole right kidney suggest focal pyelonephritis  Follow-up suggested at 3 months to demonstrate resolution  Cholelithiasis The CBD that the upper limits measuring about 9 mm  The CBD remains stable in size previous study of December 5, 2020 with no new intrahepatic ductal dilation Workstation performed: [id number]     Currently asymptomatic  UA done in the ED showed moderate bacteria  CT abdomen pelvis with contrast did show hypodense area in the upper pole of right kidney suggesting focal pyelonephritis  Patient was given IV Rocephin in the ED  Plan:  · Follow urine cultures  · Continue IV Rocephin 1 g Q 24  · Monitor intake and output closely    Hyponatremia  Assessment & Plan  Recent Labs     12/27/20  1309   SODIUM 135*     Mild hyponatremia present on admission  Likely secondary to poor p o  Intake at home  Plan:  · Will start on Plasmalyte at 75 ml/hour  · Repeat CMP in the morning    Ambulatory dysfunction  Assessment & Plan  Difficulty ambulating at home and performing ADLs  This is also potentially exacerbated by acute infection  Patient's family requesting that she be placed in nursing home given her inability to care for herself  Currently uses walker at home but is unable to pull herself up to even ambulate  Plan:  · PT/OT evaluation and treat  · Case management consulted for disposition planning    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  Assessment & Plan  Ct Head Without Contrast    Result Date: 12/27/2020  Impression: 1  No acute intracranial abnormality  Microangiopathic changes  2   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  Workstation performed: [id number]    Originally seen on past admission  Patient was discharged on Decadron tapering dose however patient was noncompliant with medication  Patient also would not want further treatment for management of mass  No sign of malignancy seen on chest abdomen or pelvis  Plan:  · Given vasogenic edema on CT, will give patient 1 dose IV Decadron 10 mg, and then starting tomorrow maintenance dose of 4 mg IV Decadron q12      History of 2019 novel coronavirus disease (COVID-19)  Assessment & Plan  Tested positive for COVID-19 on 12/04/2020  Currently asymptomatic  Plan:  · Will not retest at this point given the fact that she recently tested positive; may need repeat test prior to discharge depending on disposition  · Closely monitor SpO2 and for other symptoms    VTE Prophylaxis: Enoxaparin (Lovenox)  / sequential compression device   Code Status:  Level 3  POLST: POLST form is not discussed and not completed at this time  Anticipated Length of Stay:  Patient will be admitted on an Inpatient basis with an anticipated length of stay of  greater than 2 midnights  Justification for Hospital Stay:  Weakness and acute pyelonephritis    Chief Complaint:   "I feel lousy lately "    History of Present Illness: Nelli Avila is a 80 y o  female who presents with generalized weakness and inability to pull herself up as well as recent falls  PMH significant for left cerebellar mass seen on CT  Patient also previously tested positive for COVID-19 on 12/04/2020 and was hospitalized for generalized weakness    Since then, patient has stage she has had worsening weakness  She states she can barely pull herself up to her feet  It is extremely difficult for her to get from her bed to her bathroom  She denies any shooting pains down her legs  She denies any bowel or bladder incontinence  She denies any chest pain, shortness of breath, cough, diarrhea, fever, or chills  She states she has not been eating as well lately  Patient currently lives at home by herself but states daughter lives close by  Patient was brought in via EMS for further evaluation for weakness  Review of Systems:    Review of Systems   Constitutional: Positive for appetite change (Eating loss at home)  Negative for chills, diaphoresis, fatigue and fever  HENT: Negative  Eyes: Negative  Respiratory: Negative for cough, chest tightness, shortness of breath and wheezing  Cardiovascular: Negative for chest pain, palpitations and leg swelling  Gastrointestinal: Negative for abdominal distention, abdominal pain, blood in stool, constipation, diarrhea, nausea and vomiting  Endocrine: Negative  Genitourinary: Negative  Negative for difficulty urinating, dysuria, frequency, hematuria and urgency  Musculoskeletal: Negative for back pain, joint swelling, neck pain and neck stiffness  Skin: Negative for pallor and rash  Neurological: Positive for weakness (Generalized)  Negative for dizziness, facial asymmetry, light-headedness, numbness and headaches  Recent falls   Hematological: Negative  Psychiatric/Behavioral: Negative  All other systems reviewed and are negative  Past Medical and Surgical History:     Past Medical History:   Diagnosis Date    Brain mass        History reviewed  No pertinent surgical history  Meds/Allergies:    Prior to Admission medications    Medication Sig Start Date End Date Taking?  Authorizing Provider   amLODIPine (NORVASC) 5 mg tablet Take 1 tablet (5 mg total) by mouth daily  Patient not taking: Reported on 12/27/2020 12/9/20   Alva Nunez PA-C   cholecalciferol (VITAMIN D3) 1,000 units tablet Take 2 tablets (2,000 Units total) by mouth daily  Patient not taking: Reported on 12/27/2020 12/9/20   Alva Nunez PA-C   dexamethasone (DECADRON) 4 mg tablet Take 1 tablet (4 mg total) by mouth 4 (four) times a day (with meals and at bedtime) for 5 days, THEN 1 tablet (4 mg total) 3 (three) times a day before meals for 7 days, THEN 1 tablet (4 mg total) 2 (two) times a day for 7 days, THEN 1 tablet (4 mg total) daily for 7 days, THEN 0 5 tablets (2 mg total) daily for 7 days  Patient not taking: Reported on 12/27/2020 12/8/20 1/10/21  Alva Nunez PA-C     I have reviewed home medications with patient personally  Allergies: No Known Allergies    Social History:     Marital Status: /Civil Union   Occupation:  Retired  Patient Pre-hospital Living Situation:  Lives alone at home  Patient Pre-hospital Level of Mobility:  Uses walker  Patient Pre-hospital Diet Restrictions:  None  Substance Use History:   Social History     Substance and Sexual Activity   Alcohol Use Never    Frequency: Never     Social History     Tobacco Use   Smoking Status Never Smoker   Smokeless Tobacco Never Used     Social History     Substance and Sexual Activity   Drug Use Never       Family History:    History reviewed  No pertinent family history  Physical Exam:     Vitals:   Blood Pressure: 142/78 (12/27/20 1700)  Pulse: 74 (12/27/20 1700)  Temperature: 98 3 °F (36 8 °C) (12/27/20 1700)  Temp Source: Axillary (12/27/20 1700)  Respirations: 18 (12/27/20 1700)  Height: 5' 5" (165 1 cm) (12/27/20 1258)  Weight - Scale: 102 kg (224 lb 13 9 oz) (12/27/20 1258)  SpO2: 96 % (12/27/20 1700)    Physical Exam  Vitals signs and nursing note reviewed  Constitutional:       Appearance: She is well-developed  She is obese  She is not diaphoretic  HENT:      Head: Normocephalic and atraumatic     Eyes: General: No scleral icterus  Conjunctiva/sclera: Conjunctivae normal       Pupils: Pupils are equal, round, and reactive to light  Neck:      Musculoskeletal: Neck supple  Thyroid: No thyromegaly  Vascular: No JVD  Cardiovascular:      Rate and Rhythm: Normal rate and regular rhythm  Heart sounds: Normal heart sounds  No murmur  No friction rub  No gallop  Pulmonary:      Effort: Pulmonary effort is normal  No respiratory distress  Breath sounds: Normal breath sounds  No wheezing  Abdominal:      General: Bowel sounds are normal  There is no distension  Palpations: Abdomen is soft  Tenderness: There is no abdominal tenderness  Musculoskeletal: Normal range of motion  Right lower leg: Edema (1+ up to ankle) present  Left lower leg: Edema (1+ up to ankle) present  Lymphadenopathy:      Cervical: No cervical adenopathy  Skin:     General: Skin is warm and dry  Capillary Refill: Capillary refill takes less than 2 seconds  Coloration: Skin is not pale  Findings: No erythema  Neurological:      General: No focal deficit present  Mental Status: She is alert and oriented to person, place, and time  Cranial Nerves: Cranial nerve deficit present  Sensory: Sensation is intact  Motor: Weakness present  Comments: 1/5 hip flexor bilateral  5/5 upper extremity strength    Right eye abducted   Psychiatric:         Mood and Affect: Mood normal          Behavior: Behavior normal          Additional Data:     Lab Results: I have personally reviewed pertinent reports        Results from last 7 days   Lab Units 12/27/20  1309   WBC Thousand/uL 14 31*   HEMOGLOBIN g/dL 14 3   HEMATOCRIT % 43 1   PLATELETS Thousands/uL 92*   NEUTROS PCT % 89*   LYMPHS PCT % 5*   MONOS PCT % 5   EOS PCT % 0     Results from last 7 days   Lab Units 12/27/20  1309   POTASSIUM mmol/L 3 8   CHLORIDE mmol/L 99*   CO2 mmol/L 25   BUN mg/dL 34*   CREATININE mg/dL 0  89   CALCIUM mg/dL 8 6   ALK PHOS U/L 79   ALT U/L 56   AST U/L 21           Imaging: I have personally reviewed pertinent reports  Xr Chest 1 View Portable    Result Date: 12/5/2020  Narrative: CHEST INDICATION:   admit- cough  COMPARISON:  None EXAM PERFORMED/VIEWS:  XR CHEST PORTABLE FINDINGS: Cardiomediastinal silhouette appears unremarkable  The lungs are clear  No pneumothorax or pleural effusion  Osseous structures appear within normal limits for patient age  Impression: No acute cardiopulmonary disease  Workstation performed: [id number]     Ct Head Without Contrast    Result Date: 12/27/2020  Narrative: CT BRAIN - WITHOUT CONTRAST INDICATION:   altered  hx brain mass no treatment  COMPARISON:  12/5/2020 TECHNIQUE:  CT examination of the brain was performed  In addition to axial images, sagittal and coronal 2D reformatted images were created and submitted for interpretation  Radiation dose length product (DLP) for this visit:  946 5 mGy-cm   This examination, like all CT scans performed in the [facility], was performed utilizing techniques to minimize radiation dose exposure, including the use of iterative reconstruction and automated exposure control  IMAGE QUALITY:  Diagnostic  FINDINGS: PARENCHYMA: Decreased attenuation is noted in periventricular and subcortical white matter demonstrating an appearance that is statistically most likely to represent moderate microangiopathic change  No CT signs of acute infarction  Stable calcified massin the left cerebellar hemisphere with adjacent edema  This measures 2 7 x 2 7 cm  No acute parenchymal hemorrhage  VENTRICLES AND EXTRA-AXIAL SPACES:  Normal for the patient's age  VISUALIZED ORBITS AND PARANASAL SINUSES:  Unremarkable  CALVARIUM AND EXTRACRANIAL SOFT TISSUES:  Normal      Impression: 1  No acute intracranial abnormality  Microangiopathic changes  2   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  Workstation performed: [id number]     Ct Head Wo Contrast    Result Date: 12/5/2020  Narrative: CT BRAIN - WITHOUT CONTRAST INDICATION:   Stroke, follow up EVAL FOR CVA  COMPARISON:  None  TECHNIQUE:  CT examination of the brain was performed  In addition to axial images, sagittal and coronal 2D reformatted images were created and submitted for interpretation  Radiation dose length product (DLP) for this visit:  954 mGy-cm   This examination, like all CT scans performed in the [facility], was performed utilizing techniques to minimize radiation dose exposure, including the use of iterative reconstruction and automated exposure control  IMAGE QUALITY:  Diagnostic  FINDINGS: PARENCHYMA: Decreased attenuation is noted in periventricular and subcortical white matter demonstrating an appearance that is statistically most likely to represent advanced microangiopathic change  No CT signs of acute infarction  Partially calcified left cerebellar mass measuring at least 32 mm with extensive surrounding left cerebellar edema suspicious for primary or metastatic malignancy  Associated internal hemorrhage would be difficult to exclude given the extensive dense calcific components  4 no definite intracranial hemorrhage  VENTRICLES AND EXTRA-AXIAL SPACES:  No hydrocephalus  There is mass effect on the cerebral aqueduct by the cerebellar edema VISUALIZED ORBITS AND PARANASAL SINUSES:  Unremarkable  CALVARIUM AND EXTRACRANIAL SOFT TISSUES:  Normal      Impression: Partially calcified left cerebellar mass measuring at least 32 mm with extensive surrounding left cerebellar edema suspicious for primary or metastatic malignancy  Associated internal hemorrhage would be difficult to exclude given the extensive dense calcific components; definite hemorrhage is not identified  No hydrocephalus   There is mass effect on the cerebral aqueduct by the cerebellar edema  I personally discussed this study with MITA CLARK on 12/5/2020 at 6:35 PM  Workstation performed: [id number]     Ct Head W Wo Contrast    Result Date: 12/6/2020  Narrative: CT BRAIN - WITH AND WITHOUT CONTRAST INDICATION:   Brain mass or lesion, follow-up; ? met  Intermittent leg numbness for 2 days  History of falls  Patient has confirmed COVID-19  COMPARISON:  Noncontrast CT brain December 5, 2020, earlier in the day  TECHNIQUE:  CT examination of the brain was performed both prior to and after the administration of intravenous contrast   In addition to axial images, sagittal and coronal 2D reformatted images were created and submitted for interpretation  Radiation dose length product (DLP) for this visit:  2103 mGy-cm   This examination, like all CT scans performed in the [facility], was performed utilizing techniques to minimize radiation dose exposure, including the use of iterative reconstruction and automated exposure control  IV Contrast:  100 mL of iohexol (OMNIPAQUE)  IMAGE QUALITY:  Diagnostic  FINDINGS: PARENCHYMA:  No acute intraparenchymal hemorrhage or extra-axial fluid collections  No acute infarctions  There is a 3 4 x 2 7 x 3 0 cm partially calcified soft tissue mass adjacent to the left cerebellar hemisphere (series 10, image 9; series 1200, image 76)  There is homogeneous enhancement of the mass with suggestion of a dural tail  The mass appears extra-axial in location  There is mass effect on the left cerebellar hemisphere and 4th ventricle  There is adjacent vasogenic edema  No additional enhancing intraparenchymal masses are seen  Areas of decreased attenuation in the periventricular regions and centrum semiovale bilaterally, consistent with chronic small vessel ischemic white matter disease  Bilateral internal carotid artery and vertebral artery calcifications    There is a filling defect at the junction of the left sigmoid sinus and left transverse sinus, adjacent to the soft tissue mass, representing either flow related artifact, pacchionian granulation versus nonocclusive thrombus (series 10, image 11; series 1200, image 76)  Partially empty sella turcica  VENTRICLES AND EXTRA-AXIAL SPACES:  Enlargement of ventricles and extra-axial CSF spaces consistent with cerebral and cerebellar atrophy  VISUALIZED ORBITS AND PARANASAL SINUSES:  Unremarkable  CALVARIUM:  Normal      Impression: 1  No significant change in the partially calcified soft tissue mass in the left posterior fossa with surrounding vasogenic edema  Although the mass may be extra-axial in location and represent an aggressive, atypical meningioma, primary versus metastatic CNS neoplasm not excluded  Recommend follow-up MRI of the brain with gadolinium for further evaluation  2   Cerebral atrophy with chronic small vessel ischemic white matter disease  The study was marked in [facility] for immediate notification  Workstation performed: [id number]     Ct Chest Abdomen Pelvis W Contrast    Result Date: 12/6/2020  Narrative: CT CHEST, ABDOMEN AND PELVIS WITH IV CONTRAST INDICATION:   Metastases suspected, unknown primary; new brain met? [place] Ambulatory dysfunction  Weakness and fall  Patient has confirmed COVID-19  COMPARISON:  Chest radiographs December 4, 2020 TECHNIQUE: CT examination of the chest, abdomen and pelvis was performed  Axial, sagittal, and coronal 2D reformatted images were created from the source data and submitted for interpretation  Radiation dose length product (DLP) for this visit:  4004 mGy-cm   This examination, like all CT scans performed in the [facility], was performed utilizing techniques to minimize radiation dose exposure, including the use of iterative reconstruction and automated exposure control  IV Contrast:  100 mL of iohexol (OMNIPAQUE) Enteric Contrast: Enteric contrast was not administered  FINDINGS: CHEST LUNGS:  There is significant respiratory motion artifact, limiting evaluation   Scattered, vague areas of groundglass infiltrate are present throughout the lungs bilaterally  No focal areas of consolidation are seen  PLEURA:  Unremarkable  HEART/GREAT VESSELS:  The heart is enlarged  Coronary artery calcifications  Valvular calcifications  No evidence of a pericardial effusion  Atherosclerotic changes are present in the aortic arch  The ascending aorta is aneurysmal measuring 4 1 cm in maximum transaxial dimension  The pulmonary arteries are dilated, consistent with pulmonary artery hypertension  No emboli are seen in the main pulmonary arteries  Secondary and tertiary branches are not well opacified due to phase of contrast administration as well as respiratory motion  MEDIASTINUM AND MEGAN:  Subcentimeter mediastinal and bilateral hilar lymph nodes, nonpathologic based on CT criteria  CHEST WALL AND LOWER NECK:   Unremarkable  ABDOMEN LIVER/BILIARY TREE:  Fatty infiltrative changes are present in the liver  GALLBLADDER:  Large calcified gallstone is present in the gallbladder neck  No pericholecystic inflammatory change  SPLEEN:  Unremarkable  PANCREAS:  Unremarkable  ADRENAL GLANDS:  2 4 cm low-density lesion arising from the anterior limb of the right adrenal gland (series 5, image 55)  1 5 cm low-density nodule arising from the body of the left adrenal gland (series 5, image 55)  KIDNEYS/URETERS:  Low-density bilateral renal lesions, most compatible with cysts  These are both cortical and parapelvic  No renal calyceal or ureteric calculi  No hydronephrosis or hydroureter  STOMACH AND BOWEL:  Evaluation of the GI tract is limited due to lack of oral contrast material  Stomach moderately distended and filled with recently ingested food products and air  Moderate-sized hiatal hernia  No evidence of small bowel obstruction  Normal fecal burden throughout the colon  Scattered diverticula of the colon, most pronounced in the sigmoid colon  Sigmoid colon redundant and tortuous    Nothing to suggest acute diverticulitis  APPENDIX:  No findings to suggest appendicitis  ABDOMINOPELVIC CAVITY:  No ascites  No pneumoperitoneum  No lymphadenopathy  VESSELS:  Unremarkable for patient's age  PELVIS REPRODUCTIVE ORGANS:  Unremarkable for patient's age  URINARY BLADDER:  Not well visualized due to significant beam hardening artifact from right hip arthroplasty  Bladder grossly unremarkable  ABDOMINAL WALL/INGUINAL REGIONS:  Diastases of the rectus abdominis musculature  Small umbilical hernia containing fat  Large left inguinal hernia containing fat  OSSEOUS STRUCTURES:  No acute fracture or destructive osseous lesion  Spinal degenerative changes are noted  Impression: 1  Vague, groundglass infiltrates in the lungs bilaterally  Although the findings are likely related to respiratory motion artifact, early Covid pneumonia not excluded, given the patient's history  2   4 1 cm ascending aortic aneurysm  No evidence of dissection  3   Enlarged pulmonary arteries, compatible with pulmonary artery hypertension  4   Cholelithiasis without CT evidence of acute cholecystitis  5   Indeterminate bilateral adrenal nodules  Although its imaging features are indeterminate, it does not have suspicious imaging features (heterogeneity, necrosis, irregular margins),  but based on its size, a routine, dedicated adrenal protocol CT is recommended to confirm benignity  Adrenal recommendation based on institutional consensus and [employer] of [employer] of Radiology 2017;14:[id number] The study was marked in [facility] for immediate notification  Workstation performed: [id number]     Ct Abdomen Pelvis With Contrast    Result Date: 12/27/2020  Narrative: CT ABDOMEN AND PELVIS WITH IV CONTRAST INDICATION:   anorexia abd pain elevated bili  COMPARISON:  December 5, 2020 TECHNIQUE:  CT examination of the abdomen and pelvis was performed   Axial, sagittal, and coronal 2D reformatted images were created from the source data and submitted for interpretation  Radiation dose length product (DLP) for this visit:  1383 mGy-cm   This examination, like all CT scans performed in the [facility], was performed utilizing techniques to minimize radiation dose exposure, including the use of iterative reconstruction and automated exposure control  IV Contrast:  100 mL of iohexol (OMNIPAQUE) Enteric Contrast:  Enteric contrast was not administered  FINDINGS: ABDOMEN LOWER CHEST:  No clinically significant abnormality identified in the visualized lower chest  LIVER/BILIARY TREE:  No focal liver lesion seen No dilation of the intrahepatic ducts seen GALLBLADDER:  Cholelithiasis is seen The common bile duct measures 9 mm and is at the upper limits  Smooth tapering of the common bile duct noted in the appearance of the common bile duct is unchanged from the previous study of the December 5, 2020 SPLEEN:  Unremarkable  PANCREAS:  Unremarkable  ADRENAL GLANDS:  Nodular enlargement of the right adrenal gland seen which measures about 2 7 cm Nodular enlargement of the left adrenal gland seen which measures about 1 3 cm KIDNEYS/URETERS:  Right renal cyst seen there is a hypodense area in the upper pole of the right kidney, new from the previous cyst study, suggest focal pyelonephritis This measures 3 1 x 3 5 left kidney appear unremarkable MID right renal cyst seen cyst seen in the lower pole of the right STOMACH AND BOWEL:  Moderate amount of fecal debris in the colon seen no abnormal dilation of the small bowel loops seen APPENDIX:  No findings to suggest appendicitis  ABDOMINOPELVIC CAVITY:  No ascites  No pneumoperitoneum  No lymphadenopathy  VESSELS:  Unremarkable for patient's age  PELVIS REPRODUCTIVE ORGANS:  Unremarkable for patient's age  URINARY BLADDER:  Unremarkable  ABDOMINAL WALL/INGUINAL REGIONS:  Unremarkable   OSSEOUS STRUCTURES:  Right hip arthroplasty seen No acute compression collapse     Impression: New 3 1 x 3 5 cm hypodense area upper pole right kidney suggest focal pyelonephritis  Follow-up suggested at 3 months to demonstrate resolution Cholelithiasis The CBD that the upper limits measuring about 9 mm  The CBD remains stable in size previous study of December 5, 2020 with no new intrahepatic ductal dilation Workstation performed: [id number]       EKG, Pathology, and Other Studies Reviewed on Admission:   · EKG:  Normal sinus rhythm    Epic / Care Everywhere Records Reviewed: Yes     ** Please Note: This note has been constructed using a voice recognition system   **

## 2020-12-27 NOTE — ASSESSMENT & PLAN NOTE
Results from last 7 days   Lab Units 12/27/20  1415   LEUKOCYTES UA  Moderate*   NITRITE UA  Negative   GLUCOSE UA mg/dl 250 (1/4%)*   KETONES UA mg/dl Negative   BLOOD UA  Large*   WBC UA /hpf Innumerable*   RBC UA /hpf 10-20*   BACTERIA UA /hpf Moderate*     Recent Labs     12/27/20  1309   WBC 14 31*       Ct Abdomen Pelvis With Contrast    Result Date: 12/27/2020  Impression: New 3 1 x 3 5 cm hypodense area upper pole right kidney suggest focal pyelonephritis  Follow-up suggested at 3 months to demonstrate resolution  Cholelithiasis The CBD that the upper limits measuring about 9 mm  The CBD remains stable in size previous study of December 5, 2020 with no new intrahepatic ductal dilation Workstation performed: [id number]     Currently asymptomatic  UA done in the ED showed moderate bacteria  CT abdomen pelvis with contrast did show hypodense area in the upper pole of right kidney suggesting focal pyelonephritis  Patient was given IV Rocephin in the ED      Plan:  · Follow urine cultures  · Continue IV Rocephin 1 g Q 24  · Monitor intake and output closely

## 2020-12-27 NOTE — ASSESSMENT & PLAN NOTE
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

## 2020-12-27 NOTE — ED PROVIDER NOTES
History  Chief Complaint   Patient presents with    Weakness - Generalized     Pt presents to ED via EMS from home w/ multiple issues  Pt feeling weak, hasn't eaten since yesterday  Pt's   last night  Pt (+) brain mass, never had follow up  Pt (+) multiple falls lately  Pt (+) covid 2020  Pt's family requesting nursing home placement due to pt unable to care for herself, per EMS  No family present during triage  80-year-old female brought in for generalized weakness  According to family her   last night and she has not been eating  Patient was admitted here on  for falls was COVID positive at this time and during the workup she was found to have a brain mass  Patient refused treatment or further workup at that time  Ems reports she never followed up outpatient she continues to have multiple falls  Family states that they cannot care for her and what her placed in a nursing home  Patient again reiterates that she does not want treatment for the mass and that she is not interested in hospice care either  History provided by:  Patient and medical records   used: No    Malaise - 7 years or greater  Severity:  Moderate  Onset quality:  Gradual  Timing:  Constant  Progression:  Worsening  Chronicity:  Chronic  Context: stress    Context comment:  Recently diagnosed brain tumor that is not being treated  Ineffective treatments:  None tried  Associated symptoms: anorexia, difficulty walking and falls    Associated symptoms: no abdominal pain, no arthralgias, no chest pain, no cough, no diarrhea, no fever, no headaches and no shortness of breath    Risk factors: recent stressors    Risk factors: no anemia and no coronary artery disease        Prior to Admission Medications   Prescriptions Last Dose Informant Patient Reported? Taking?    amLODIPine (NORVASC) 5 mg tablet Not Taking at Unknown time  No No   Sig: Take 1 tablet (5 mg total) by mouth daily Patient not taking: Reported on 12/27/2020   cholecalciferol (VITAMIN D3) 1,000 units tablet Not Taking at Unknown time  No No   Sig: Take 2 tablets (2,000 Units total) by mouth daily   Patient not taking: Reported on 12/27/2020   dexamethasone (DECADRON) 4 mg tablet Not Taking at Unknown time  No No   Sig: Take 1 tablet (4 mg total) by mouth 4 (four) times a day (with meals and at bedtime) for 5 days, THEN 1 tablet (4 mg total) 3 (three) times a day before meals for 7 days, THEN 1 tablet (4 mg total) 2 (two) times a day for 7 days, THEN 1 tablet (4 mg total) daily for 7 days, THEN 0 5 tablets (2 mg total) daily for 7 days  Patient not taking: Reported on 12/27/2020      Facility-Administered Medications: None       Past Medical History:   Diagnosis Date    Brain mass        History reviewed  No pertinent surgical history  History reviewed  No pertinent family history  I have reviewed and agree with the history as documented  E-Cigarette/Vaping    E-Cigarette Use Never User      E-Cigarette/Vaping Substances    Nicotine No     THC No     CBD No     Flavoring No     Other No     Unknown No      Social History     Tobacco Use    Smoking status: Never Smoker    Smokeless tobacco: Never Used   Substance Use Topics    Alcohol use: Never     Frequency: Never    Drug use: Never       Review of Systems   Constitutional: Positive for activity change, appetite change and fatigue  Negative for fever  HENT: Negative for congestion and ear pain  Eyes: Negative for discharge and redness  Respiratory: Negative for apnea, cough, shortness of breath and wheezing  Cardiovascular: Negative for chest pain  Gastrointestinal: Positive for anorexia  Negative for abdominal pain and diarrhea  Endocrine: Negative for cold intolerance and polydipsia  Genitourinary: Negative for difficulty urinating and hematuria  Musculoskeletal: Positive for falls and gait problem   Negative for arthralgias and back pain    Skin: Negative for color change and rash  Allergic/Immunologic: Negative for environmental allergies and immunocompromised state  Neurological: Positive for weakness  Negative for numbness and headaches  Hematological: Negative for adenopathy  Does not bruise/bleed easily  Psychiatric/Behavioral: Negative for agitation and behavioral problems  Physical Exam  Physical Exam  Vitals signs and nursing note reviewed  Constitutional:       Appearance: Normal appearance  She is well-developed  She is not toxic-appearing  HENT:      Head: Normocephalic and atraumatic  Right Ear: Tympanic membrane and external ear normal       Left Ear: Tympanic membrane and external ear normal       Nose: Nose normal  No nasal deformity or rhinorrhea  Mouth/Throat:      Dentition: Normal dentition  Pharynx: Uvula midline  Eyes:      General: Lids are normal          Right eye: No discharge  Left eye: No discharge  Conjunctiva/sclera: Conjunctivae normal       Pupils: Pupils are equal, round, and reactive to light  Neck:      Musculoskeletal: Normal range of motion and neck supple  Vascular: No carotid bruit or JVD  Trachea: Trachea normal    Cardiovascular:      Rate and Rhythm: Normal rate and regular rhythm  No extrasystoles are present  Chest Wall: PMI is not displaced  Pulses: Normal pulses  Pulmonary:      Effort: Pulmonary effort is normal  No accessory muscle usage or respiratory distress  Breath sounds: Normal breath sounds  No wheezing, rhonchi or rales  Abdominal:      General: Bowel sounds are normal       Palpations: Abdomen is soft  Abdomen is not rigid  There is no mass  Tenderness: There is no abdominal tenderness  There is no guarding or rebound  Musculoskeletal:      Right shoulder: She exhibits normal range of motion, no bony tenderness, no swelling and no deformity        Cervical back: Normal  She exhibits normal range of motion, no tenderness, no bony tenderness and no deformity  Lymphadenopathy:      Cervical: No cervical adenopathy  Skin:     General: Skin is warm and dry  Findings: No rash  Neurological:      Mental Status: She is alert and oriented to person, place, and time  GCS: GCS eye subscore is 4  GCS verbal subscore is 5  GCS motor subscore is 6  Cranial Nerves: No cranial nerve deficit  Sensory: No sensory deficit  Deep Tendon Reflexes: Reflexes are normal and symmetric  Psychiatric:         Speech: Speech normal          Behavior: Behavior normal          Vital Signs  ED Triage Vitals   Temperature Pulse Respirations Blood Pressure SpO2   12/27/20 1258 12/27/20 1258 12/27/20 1258 12/27/20 1258 12/27/20 1258   98 5 °F (36 9 °C) 87 16 127/70 97 %      Temp Source Heart Rate Source Patient Position - Orthostatic VS BP Location FiO2 (%)   12/27/20 1258 12/27/20 1258 12/27/20 1538 12/27/20 1414 --   Oral Monitor Lying Right arm       Pain Score       12/27/20 1414       No Pain           Vitals:    12/27/20 1258 12/27/20 1414 12/27/20 1538   BP: 127/70 139/65 (!) 179/75   Pulse: 87 81 73   Patient Position - Orthostatic VS:   Lying         Visual Acuity      ED Medications  Medications   nystatin (MYCOSTATIN) ointment ( Topical Given 12/27/20 1509)   iohexol (OMNIPAQUE) 350 MG/ML injection (MULTI-DOSE) 100 mL (100 mL Intravenous Given 12/27/20 1501)   ceftriaxone (ROCEPHIN) 1 g/50 mL in dextrose IVPB (0 mg Intravenous Stopped 12/27/20 1556)       Diagnostic Studies  Results Reviewed     Procedure Component Value Units Date/Time    Urine Microscopic [[id number]]  (Abnormal) Collected: 12/27/20 1415    Lab Status: Final result Specimen: Urine, Other Updated: 12/27/20 1455     RBC, UA 10-20 /hpf      WBC, UA Innumerable /hpf      Epithelial Cells Occasional /hpf      Bacteria, UA Moderate /hpf     Urine culture [[id number]] Collected: 12/27/20 1415    Lab Status:  In process Specimen: Urine, Other Updated: 12/27/20 1455    UA w Reflex to Microscopic w Reflex to Culture [[id number]]  (Abnormal) Collected: 12/27/20 1415    Lab Status: Final result Specimen: Urine, Other Updated: 12/27/20 1423     Color, UA Yellow     Clarity, UA Slightly Cloudy     Specific Gravity, UA 1 025     pH, UA 5 5     Leukocytes, UA Moderate     Nitrite, UA Negative     Protein, UA 30 (1+) mg/dl      Glucose,  (1/4%) mg/dl      Ketones, UA Negative mg/dl      Urobilinogen, UA 0 2 E U /dl      Bilirubin, UA Negative     Blood, UA Large    CBC and differential [[id number]]  (Abnormal) Collected: 12/27/20 1309    Lab Status: Final result Specimen: Blood from Arm, Left Updated: 12/27/20 1401     WBC 14 31 Thousand/uL      RBC 4 81 Million/uL      Hemoglobin 14 3 g/dL      Hematocrit 43 1 %      MCV 90 fL      MCH 29 7 pg      MCHC 33 2 g/dL      RDW 12 5 %      MPV 9 7 fL      Platelets 92 Thousands/uL      nRBC 0 /100 WBCs      Neutrophils Relative 89 %      Immat GRANS % 1 %      Lymphocytes Relative 5 %      Monocytes Relative 5 %      Eosinophils Relative 0 %      Basophils Relative 0 %      Neutrophils Absolute 12 59 Thousands/µL      Immature Grans Absolute 0 18 Thousand/uL      Lymphocytes Absolute 0 75 Thousands/µL      Monocytes Absolute 0 75 Thousand/µL      Eosinophils Absolute 0 02 Thousand/µL      Basophils Absolute 0 02 Thousands/µL     Comprehensive metabolic panel [[id number]]  (Abnormal) Collected: 12/27/20 1309    Lab Status: Final result Specimen: Blood from Arm, Left Updated: 12/27/20 1358     Sodium 135 mmol/L      Potassium 3 8 mmol/L      Chloride 99 mmol/L      CO2 25 mmol/L      ANION GAP 11 mmol/L      BUN 34 mg/dL      Creatinine 0 89 mg/dL      Glucose 231 mg/dL      Calcium 8 6 mg/dL      Corrected Calcium 9 6 mg/dL      AST 21 U/L      ALT 56 U/L      Alkaline Phosphatase 79 U/L      Total Protein 6 1 g/dL      Albumin 2 8 g/dL      Total Bilirubin 1 63 mg/dL      eGFR 57 ml/min/1 73sq m     Narrative:      [facility] [employer] guidelines for Chronic Kidney Disease (CKD):     Stage 1 with normal or high GFR (GFR > 90 mL/min/1 73 square meters)    Stage 2 Mild CKD (GFR = 60-89 mL/min/1 73 square meters)    Stage 3A Moderate CKD (GFR = 45-59 mL/min/1 73 square meters)    Stage 3B Moderate CKD (GFR = 30-44 mL/min/1 73 square meters)    Stage 4 Severe CKD (GFR = 15-29 mL/min/1 73 square meters)    Stage 5 End Stage CKD (GFR <15 mL/min/1 73 square meters)  Note: GFR calculation is accurate only with a steady state creatinine    NT-BNP PRO [[id number]]  (Normal) Collected: 12/27/20 1309    Lab Status: Final result Specimen: Blood from Arm, Left Updated: 12/27/20 1358     NT-proBNP 324 pg/mL     Magnesium [[id number]]  (Normal) Collected: 12/27/20 1309    Lab Status: Final result Specimen: Blood from Arm, Left Updated: 12/27/20 1358     Magnesium 1 8 mg/dL     Troponin I [[id number]]  (Normal) Collected: 12/27/20 1309    Lab Status: Final result Specimen: Blood from Arm, Left Updated: 12/27/20 1353     Troponin I 0 03 ng/mL                  CT abdomen pelvis with contrast   Final Result by Juan Brantley MD (12/27 4302)   New 3 1 x 3 5 cm hypodense area upper pole right kidney suggest focal pyelonephritis  Follow-up suggested at 3 months to demonstrate resolution   Cholelithiasis      The CBD that the upper limits measuring about 9 mm  The CBD remains stable in size previous study of December 5, 2020 with no new intrahepatic ductal dilation      Workstation performed: [id number]         CT head without contrast   Final Result by Marsha Huerta MD (12/27 5832)      1  No acute intracranial abnormality  Microangiopathic changes  2   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                    Workstation performed: [id number]         XR chest 1 view portable    (Results Pending)   XR foot 3+ views RIGHT    (Results Pending)              Procedures  ECG 12 Lead Documentation Only    Date/Time: 12/27/2020 1:13 PM  Performed by: Arnie Garcia DO  Authorized by: Arnie Garcia DO     Rate:     ECG rate:  88  Rhythm:     Rhythm: sinus rhythm    Ectopy:     Ectopy: none               ED Course                             SBIRT 20yo+      Most Recent Value   SBIRT (24 yo +)   In order to provide better care to our patients, we are screening all of our patients for alcohol and drug use  Would it be okay to ask you these screening questions? No Filed at: 12/27/2020 1302                    MDM  Number of Diagnoses or Management Options  Pyelonephritis: new and requires workup  Weakness: new and requires workup     Amount and/or Complexity of Data Reviewed  Clinical lab tests: ordered and reviewed  Tests in the radiology section of CPT®: ordered and reviewed  Tests in the medicine section of CPT®: ordered and reviewed  Independent visualization of images, tracings, or specimens: yes    Patient Progress  Patient progress: stable      Disposition  Final diagnoses:   Weakness   Pyelonephritis     Time reflects when diagnosis was documented in both MDM as applicable and the Disposition within this note     Time User Action Codes Description Comment    12/27/2020  3:52 PM Keisha DAVE Add [R53 1] Weakness     12/27/2020  3:52 PM Hellen Cook Add [N12] Pyelonephritis       ED Disposition     ED Disposition Condition Date/Time Comment    Admit Stable Sun Dec 27, 2020  3:52 PM Case was discussed with  and the patient's admission status was agreed to be Admission Status: inpatient status to the service of Dr Devorah Juarez  Follow-up Information    None         Patient's Medications   Discharge Prescriptions    No medications on file     No discharge procedures on file      PDMP Review     None          ED Provider  Electronically Signed by           Arnie Garcia DO  12/27/20 4874

## 2020-12-27 NOTE — ASSESSMENT & PLAN NOTE
Tested positive for COVID-19 on 12/04/2020  Currently asymptomatic       Plan:  · Will not retest at this point given the fact that she recently tested positive; may need repeat test prior to discharge depending on disposition  · Closely monitor SpO2 and for other symptoms

## 2020-12-28 PROBLEM — D64.9 ANEMIA: Status: ACTIVE | Noted: 2020-12-28

## 2020-12-28 PROBLEM — D72.829 LEUKOCYTOSIS: Status: ACTIVE | Noted: 2020-12-28

## 2020-12-28 PROBLEM — R73.9 HYPERGLYCEMIA: Status: ACTIVE | Noted: 2020-12-28

## 2020-12-28 LAB
ALBUMIN SERPL BCP-MCNC: 2 G/DL (ref 3.5–5)
ALP SERPL-CCNC: 62 U/L (ref 46–116)
ALT SERPL W P-5'-P-CCNC: 44 U/L (ref 12–78)
ANION GAP SERPL CALCULATED.3IONS-SCNC: 8 MMOL/L (ref 4–13)
AST SERPL W P-5'-P-CCNC: 18 U/L (ref 5–45)
BASOPHILS # BLD AUTO: 0.01 THOUSANDS/ΜL (ref 0–0.1)
BASOPHILS NFR BLD AUTO: 0 % (ref 0–1)
BILIRUB SERPL-MCNC: 0.62 MG/DL (ref 0.2–1)
BUN SERPL-MCNC: 22 MG/DL (ref 5–25)
CALCIUM ALBUM COR SERPL-MCNC: 9.2 MG/DL (ref 8.3–10.1)
CALCIUM SERPL-MCNC: 7.6 MG/DL (ref 8.3–10.1)
CHLORIDE SERPL-SCNC: 101 MMOL/L (ref 100–108)
CO2 SERPL-SCNC: 26 MMOL/L (ref 21–32)
CREAT SERPL-MCNC: 0.64 MG/DL (ref 0.6–1.3)
EOSINOPHIL # BLD AUTO: 0 THOUSAND/ΜL (ref 0–0.61)
EOSINOPHIL NFR BLD AUTO: 0 % (ref 0–6)
ERYTHROCYTE [DISTWIDTH] IN BLOOD BY AUTOMATED COUNT: 12.6 % (ref 11.6–15.1)
GFR SERPL CREATININE-BSD FRML MDRD: 79 ML/MIN/1.73SQ M
GLUCOSE SERPL-MCNC: 252 MG/DL (ref 65–140)
HCT VFR BLD AUTO: 33.8 % (ref 34.8–46.1)
HGB BLD-MCNC: 11.3 G/DL (ref 11.5–15.4)
IMM GRANULOCYTES # BLD AUTO: 0.11 THOUSAND/UL (ref 0–0.2)
IMM GRANULOCYTES NFR BLD AUTO: 1 % (ref 0–2)
LYMPHOCYTES # BLD AUTO: 0.32 THOUSANDS/ΜL (ref 0.6–4.47)
LYMPHOCYTES NFR BLD AUTO: 3 % (ref 14–44)
MCH RBC QN AUTO: 30.1 PG (ref 26.8–34.3)
MCHC RBC AUTO-ENTMCNC: 33.4 G/DL (ref 31.4–37.4)
MCV RBC AUTO: 90 FL (ref 82–98)
MONOCYTES # BLD AUTO: 0.26 THOUSAND/ΜL (ref 0.17–1.22)
MONOCYTES NFR BLD AUTO: 3 % (ref 4–12)
NEUTROPHILS # BLD AUTO: 8.78 THOUSANDS/ΜL (ref 1.85–7.62)
NEUTS SEG NFR BLD AUTO: 93 % (ref 43–75)
NRBC BLD AUTO-RTO: 0 /100 WBCS
PLATELET # BLD AUTO: 72 THOUSANDS/UL (ref 149–390)
PMV BLD AUTO: 10.4 FL (ref 8.9–12.7)
POTASSIUM SERPL-SCNC: 4.1 MMOL/L (ref 3.5–5.3)
PROT SERPL-MCNC: 5 G/DL (ref 6.4–8.2)
RBC # BLD AUTO: 3.75 MILLION/UL (ref 3.81–5.12)
SODIUM SERPL-SCNC: 135 MMOL/L (ref 136–145)
WBC # BLD AUTO: 9.48 THOUSAND/UL (ref 4.31–10.16)

## 2020-12-28 PROCEDURE — 99232 SBSQ HOSP IP/OBS MODERATE 35: CPT | Performed by: INTERNAL MEDICINE

## 2020-12-28 PROCEDURE — 85025 COMPLETE CBC W/AUTO DIFF WBC: CPT | Performed by: INTERNAL MEDICINE

## 2020-12-28 PROCEDURE — 80053 COMPREHEN METABOLIC PANEL: CPT | Performed by: INTERNAL MEDICINE

## 2020-12-28 RX ADMIN — DEXAMETHASONE SODIUM PHOSPHATE 4 MG: 4 INJECTION, SOLUTION INTRA-ARTICULAR; INTRALESIONAL; INTRAMUSCULAR; INTRAVENOUS; SOFT TISSUE at 09:12

## 2020-12-28 RX ADMIN — DEXAMETHASONE SODIUM PHOSPHATE 4 MG: 4 INJECTION, SOLUTION INTRA-ARTICULAR; INTRALESIONAL; INTRAMUSCULAR; INTRAVENOUS; SOFT TISSUE at 21:54

## 2020-12-28 RX ADMIN — ENOXAPARIN SODIUM 40 MG: 40 INJECTION SUBCUTANEOUS at 09:11

## 2020-12-28 RX ADMIN — CEFTRIAXONE 1000 MG: 1 INJECTION, POWDER, FOR SOLUTION INTRAMUSCULAR; INTRAVENOUS at 16:11

## 2020-12-28 NOTE — INCIDENTAL FINDINGS
The following findings require follow up:  Radiographic finding   Finding: Xray foot: Small avulsion injury adjacent to the 1st cuneiform appears          acute          Ct abdomen and pelvis: New 3 1 x 3 5 cm hypodense area upper pole right         kidney suggest focal pyelonephritis    Follow-up suggested at 3 months to demonstrate resolution   Follow up required: f/u with pcp   Follow up should be done within 3 month(s)

## 2020-12-28 NOTE — PROGRESS NOTES
Progress Note Joy Dooley 10/21/1930, 80 y o  female MRN: [id number]    Unit/Bed#: S -01 Encounter: [id number]    Primary Care Provider: Kun Bunn MD   Date and time admitted to hospital: 12/27/2020 12:52 PM        * Acute pyelonephritis  Assessment & Plan  Results from last 7 days   Lab Units 12/27/20  1415   LEUKOCYTES UA  Moderate*   NITRITE UA  Negative   GLUCOSE UA mg/dl 250 (1/4%)*   KETONES UA mg/dl Negative   BLOOD UA  Large*   WBC UA /hpf Innumerable*   RBC UA /hpf 10-20*   BACTERIA UA /hpf Moderate*     Recent Labs     12/27/20  1309 12/28/20  0647   WBC 14 31* 9 48       · Ct Abdomen Pelvis With Contrast  Impression: New 3 1 x 3 5 cm hypodense area upper pole right kidney suggest focal pyelonephritis  Follow-up suggested at 3 months to demonstrate resolution  Cholelithiasis The CBD that the upper limits measuring about 9 mm  The CBD remains stable in size previous study of December 5, 2020 with no new intrahepatic ductal dilation   · Currently asymptomatic  · UA done in the ED showed moderate bacteria  CT abdomen pelvis with contrast did show hypodense area in the upper pole of right kidney suggesting focal pyelonephritis  Patient was given IV Rocephin in the ED  Plan:  · Follow urine cultures  · Continue IV Rocephin 1 g Q 24  · Monitor intake and output closely    Ambulatory dysfunction  Assessment & Plan  Difficulty ambulating at home and performing ADLs  This is also potentially exacerbated by acute infection  Patient's family requesting that she be placed in nursing home given her inability to care for herself  Currently uses walker at home but is unable to pull herself up to even ambulate  Plan:  · PT/OT evaluation and treat  · Case management consulted for disposition planning    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  Assessment & Plan  Ct Head Without Contrast    Result Date: 12/27/2020  Impression: 1  No acute intracranial abnormality  Microangiopathic changes  2   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  Workstation performed: [id number]    Originally seen on past admission  Patient was discharged on Decadron tapering dose however patient was noncompliant with medication  Patient also would not want further treatment for management of mass  No sign of malignancy seen on chest abdomen or pelvis  Plan:  · Given vasogenic edema on CT, will give patient 1 dose IV Decadron 10 mg, and then starting tomorrow maintenance dose of 4 mg IV Decadron q12      Hyperglycemia  Assessment & Plan  Likely in the setting of steroid therapy    Plan  Continue monitoring      Anemia  Assessment & Plan  hgb 11 3 hct 33 8    Plan  Continue monitoring      Leukocytosis  Assessment & Plan  Leukocytes 14 31 likely in the setting of urinary tract infection, steroid therapy can also be contributory    Plan  Continue monitoring  Continue antibiotic therapy    History of 2019 novel coronavirus disease (COVID-19)  Assessment & Plan  Tested positive for COVID-19 on 12/04/2020  Currently asymptomatic  Plan:  · Will not retest at this point given the fact that she recently tested positive; may need repeat test prior to discharge depending on disposition  · Closely monitor SpO2 and for other symptoms    Hyponatremia  Assessment & Plan  Recent Labs     12/27/20  1309 12/28/20  0527   SODIUM 135* 135*      Likely secondary to poor p o  Intake at home   asymptomatic    Plan:  · Continue monitoring        VTE Pharmacologic Prophylaxis:   Pharmacologic: Enoxaparin (Lovenox)  Mechanical VTE Prophylaxis in Place: Yes    Discussions with Specialists or Other Care Team Provider: attending physician, GIOVANI    Education and Discussions with Family / Patient: Patient has been fully educated about her current diagnosis and treatment plan, wants to proceed with medical treatment, noticed sad during our encounter, she was offered evaluation by psych team however patient declined    Current Length of Stay: 1 day(s)    Current Patient Status: Inpatient     Discharge Plan / Estimated Discharge Date: not yet established    Code Status: Level 3 - DNAR and DNI      Subjective:   Patient denied any chest pain, sob, palpitations , nausea, vomit    Objective:     Vitals:   Temp (24hrs), Av 3 °F (36 8 °C), Min:97 8 °F (36 6 °C), Max:99 °F (37 2 °C)    Temp:  [97 8 °F (36 6 °C)-99 °F (37 2 °C)] 98 °F (36 7 °C)  HR:  [57-74] 61  Resp:  [17-18] 18  BP: (124-142)/(68-84) 140/77  SpO2:  [96 %-98 %] 97 %  Body mass index is 37 42 kg/m²  Input and Output Summary (last 24 hours): Intake/Output Summary (Last 24 hours) at 2020 1617  Last data filed at 2020 1410  Gross per 24 hour   Intake    Output 1059 ml   Net -1059 ml       Physical Exam:     Physical Exam  Vitals signs and nursing note reviewed  Constitutional:       General: She is not in acute distress  Appearance: Normal appearance  She is normal weight  She is not toxic-appearing  HENT:      Head: Normocephalic and atraumatic  Right Ear: Tympanic membrane normal       Left Ear: Tympanic membrane normal       Nose: Nose normal  No congestion or rhinorrhea  Mouth/Throat:      Mouth: Mucous membranes are moist       Pharynx: Oropharynx is clear  No oropharyngeal exudate or posterior oropharyngeal erythema  Eyes:      Extraocular Movements: Extraocular movements intact  Conjunctiva/sclera: Conjunctivae normal       Pupils: Pupils are equal, round, and reactive to light  Neck:      Musculoskeletal: Normal range of motion and neck supple  No neck rigidity or muscular tenderness  Cardiovascular:      Rate and Rhythm: Normal rate  Pulses: Normal pulses  Heart sounds: No murmur  No gallop  Pulmonary:      Effort: Pulmonary effort is normal  No respiratory distress  Breath sounds: No wheezing or rales     Abdominal:      General: Bowel sounds are normal  There is no distension  Palpations: Abdomen is soft  Tenderness: There is no abdominal tenderness  Musculoskeletal: Normal range of motion  General: No swelling or tenderness  Skin:     General: Skin is warm  Capillary Refill: Capillary refill takes 2 to 3 seconds  Neurological:      Mental Status: She is alert and oriented to person, place, and time  Psychiatric:         Behavior: Behavior normal       Comments: Sad affect       Additional Data:     Labs:    Results from last 7 days   Lab Units 12/28/20  0647   WBC Thousand/uL 9 48   HEMOGLOBIN g/dL 11 3*   HEMATOCRIT % 33 8*   PLATELETS Thousands/uL 72*   NEUTROS PCT % 93*   LYMPHS PCT % 3*   MONOS PCT % 3*   EOS PCT % 0     Results from last 7 days   Lab Units 12/28/20  0527   POTASSIUM mmol/L 4 1   CHLORIDE mmol/L 101   CO2 mmol/L 26   BUN mg/dL 22   CREATININE mg/dL 0 64   CALCIUM mg/dL 7 6*   ALK PHOS U/L 62   ALT U/L 44   AST U/L 18           * I Have Reviewed All Lab Data Listed Above  * Additional Pertinent Lab Tests Reviewed: Ranulfo 66 Admission Reviewed    Imaging:    Imaging Reports Reviewed Today Include: none  Imaging Personally Reviewed by Myself Includes:  none    Recent Cultures (last 7 days):           Last 24 Hours Medication List:   Current Facility-Administered Medications   Medication Dose Route Frequency Provider Last Rate    acetaminophen  650 mg Oral Q6H PRN Fifi Jamison MD      cefTRIAXone  1,000 mg Intravenous Q24H Fifi Jamison MD 1,000 mg (12/28/20 1611)    dexamethasone  4 mg Intravenous Q12H Sharad Barraza MD      enoxaparin  40 mg Subcutaneous Daily Fifi Jamison MD      multi-electrolyte  75 mL/hr Intravenous Continuous Fifi Jamison MD 75 mL/hr (12/27/20 1841)    ondansetron  4 mg Intravenous Q6H PRN Fifi Jamison MD          Today, Patient Was Seen By: Nazanin Nixon MD    ** Please Note: This note has been constructed using a voice recognition system   **

## 2020-12-28 NOTE — PLAN OF CARE
Problem: Potential for Falls  Goal: Patient will remain free of falls  Description: INTERVENTIONS:  - Assess patient frequently for physical needs  -  Identify cognitive and physical deficits and behaviors that affect risk of falls    -  [place] fall precautions as indicated by assessment   - Educate patient/family on patient safety including physical limitations  - Instruct patient to call for assistance with activity based on assessment  - Modify environment to reduce risk of injury  - Consider OT/PT consult to assist with strengthening/mobility  Outcome: Progressing     Problem: Prexisting or High Potential for Compromised Skin Integrity  Goal: Skin integrity is maintained or improved  Description: INTERVENTIONS:  - Identify patients at risk for skin breakdown  - Assess and monitor skin integrity  - Assess and monitor nutrition and hydration status  - Monitor labs   - Assess for incontinence   - Turn and reposition patient  - Assist with mobility/ambulation  - Relieve pressure over bony prominences  - Avoid friction and shearing  - Provide appropriate hygiene as needed including keeping skin clean and dry  - Evaluate need for skin moisturizer/barrier cream  - Collaborate with interdisciplinary team   - Patient/family teaching  - Consider wound care consult   Outcome: Progressing

## 2020-12-28 NOTE — ASSESSMENT & PLAN NOTE
Results from last 7 days   Lab Units 12/27/20  1415   LEUKOCYTES UA  Moderate*   NITRITE UA  Negative   GLUCOSE UA mg/dl 250 (1/4%)*   KETONES UA mg/dl Negative   BLOOD UA  Large*   WBC UA /hpf Innumerable*   RBC UA /hpf 10-20*   BACTERIA UA /hpf Moderate*     Recent Labs     12/27/20  1309 12/28/20  0647   WBC 14 31* 9 48       · Ct Abdomen Pelvis With Contrast  Impression: New 3 1 x 3 5 cm hypodense area upper pole right kidney suggest focal pyelonephritis  Follow-up suggested at 3 months to demonstrate resolution  Cholelithiasis The CBD that the upper limits measuring about 9 mm  The CBD remains stable in size previous study of December 5, 2020 with no new intrahepatic ductal dilation   · Currently asymptomatic  · UA done in the ED showed moderate bacteria  CT abdomen pelvis with contrast did show hypodense area in the upper pole of right kidney suggesting focal pyelonephritis  Patient was given IV Rocephin in the ED      Plan:  · Follow urine cultures  · Continue IV Rocephin 1 g Q 24  · Monitor intake and output closely

## 2020-12-28 NOTE — PROGRESS NOTES
Patient expresses her desire to die and continues to state that she wants to be " placed with the dead people"  AP on call made aware  Will continue to monitor

## 2020-12-28 NOTE — ASSESSMENT & PLAN NOTE
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

## 2020-12-28 NOTE — ASSESSMENT & PLAN NOTE
Leukocytes 14 31 likely in the setting of urinary tract infection, steroid therapy can also be contributory    Plan  Continue monitoring  Continue antibiotic therapy

## 2020-12-28 NOTE — CASE MANAGEMENT
LOS: 1 DAY  PATIENT IS NOT A BUNDLE  PATIENT IS A READMISSION  UNPLANNED RISK OF READMISSION: 15     CM met with the Patient at the bedside; Patient was lying in bed and did not wish to complete assessment with CM  Patient states that she isn't sure why she is here  CM received permission from Patient to contact her daughter, Judson Lange, regarding DCP  CM spoke to the Patient's daughter, Judson Lange, via phone to complete assessment  Judson Lange reports that Patient presented to the hospital due to increased weakness at home, and the Patient was unable to get out of bed  Judson Lange states that she has noticed that the Patient has become somewhat depressed, and was noted to be eating very little prior to this admission  Judson Lange states that her , the Patient's IZZY recently passed and this could be contributing to her mood  Patient resides in a 2-story home independently  Prior to last admission, Patient was independent with all ADLs and utilized no DME  Since her last discharge, Patient has been using a RW to ambulate and they were planning to get a tub seat  Patient is active with [address]  In the past, Patient has been to [facility] following a hip surgery  At this time, Patient's daughter is requesting a referral to [facility] for STR  Cm dept  Will continue to follow and update Patient and daughter regarding progress of referral      CM sent referral for STR to [facility] via [address]  Cm dept  Will follow for acceptance

## 2020-12-28 NOTE — ASSESSMENT & PLAN NOTE
Recent Labs     12/27/20  1309 12/28/20  0527   SODIUM 135* 135*      Likely secondary to poor p o  Intake at home   asymptomatic    Plan:  · Continue monitoring

## 2020-12-29 PROBLEM — D72.829 LEUKOCYTOSIS: Status: RESOLVED | Noted: 2020-12-28 | Resolved: 2020-12-29

## 2020-12-29 LAB
ANION GAP SERPL CALCULATED.3IONS-SCNC: 7 MMOL/L (ref 4–13)
BACTERIA UR CULT: NORMAL
BASOPHILS # BLD MANUAL: 0 THOUSAND/UL (ref 0–0.1)
BASOPHILS NFR MAR MANUAL: 0 % (ref 0–1)
BUN SERPL-MCNC: 20 MG/DL (ref 5–25)
CALCIUM SERPL-MCNC: 8.1 MG/DL (ref 8.3–10.1)
CHLORIDE SERPL-SCNC: 102 MMOL/L (ref 100–108)
CO2 SERPL-SCNC: 26 MMOL/L (ref 21–32)
CREAT SERPL-MCNC: 0.7 MG/DL (ref 0.6–1.3)
EOSINOPHIL # BLD MANUAL: 0 THOUSAND/UL (ref 0–0.4)
EOSINOPHIL NFR BLD MANUAL: 0 % (ref 0–6)
ERYTHROCYTE [DISTWIDTH] IN BLOOD BY AUTOMATED COUNT: 12.4 % (ref 11.6–15.1)
FLUAV RNA RESP QL NAA+PROBE: NEGATIVE
FLUBV RNA RESP QL NAA+PROBE: NEGATIVE
GFR SERPL CREATININE-BSD FRML MDRD: 77 ML/MIN/1.73SQ M
GLUCOSE SERPL-MCNC: 272 MG/DL (ref 65–140)
HCT VFR BLD AUTO: 34.1 % (ref 34.8–46.1)
HGB BLD-MCNC: 11.3 G/DL (ref 11.5–15.4)
LYMPHOCYTES # BLD AUTO: 0.45 THOUSAND/UL (ref 0.6–4.47)
LYMPHOCYTES # BLD AUTO: 5 % (ref 14–44)
MCH RBC QN AUTO: 29.7 PG (ref 26.8–34.3)
MCHC RBC AUTO-ENTMCNC: 33.1 G/DL (ref 31.4–37.4)
MCV RBC AUTO: 90 FL (ref 82–98)
MONOCYTES # BLD AUTO: 0.27 THOUSAND/UL (ref 0–1.22)
MONOCYTES NFR BLD: 3 % (ref 4–12)
NEUTROPHILS # BLD MANUAL: 8.14 THOUSAND/UL (ref 1.85–7.62)
NEUTS SEG NFR BLD AUTO: 90 % (ref 43–75)
NRBC BLD AUTO-RTO: 0 /100 WBCS
PLATELET # BLD AUTO: 85 THOUSANDS/UL (ref 149–390)
PLATELET BLD QL SMEAR: ABNORMAL
PMV BLD AUTO: 10.8 FL (ref 8.9–12.7)
POTASSIUM SERPL-SCNC: 4.2 MMOL/L (ref 3.5–5.3)
RBC # BLD AUTO: 3.81 MILLION/UL (ref 3.81–5.12)
RBC MORPH BLD: NORMAL
RSV RNA RESP QL NAA+PROBE: NEGATIVE
SARS-COV-2 RNA RESP QL NAA+PROBE: POSITIVE
SODIUM SERPL-SCNC: 135 MMOL/L (ref 136–145)
TOTAL CELLS COUNTED SPEC: 100
VARIANT LYMPHS # BLD AUTO: 2 %
WBC # BLD AUTO: 9.04 THOUSAND/UL (ref 4.31–10.16)

## 2020-12-29 PROCEDURE — 85027 COMPLETE CBC AUTOMATED: CPT | Performed by: INTERNAL MEDICINE

## 2020-12-29 PROCEDURE — 80048 BASIC METABOLIC PNL TOTAL CA: CPT | Performed by: INTERNAL MEDICINE

## 2020-12-29 PROCEDURE — 85007 BL SMEAR W/DIFF WBC COUNT: CPT | Performed by: INTERNAL MEDICINE

## 2020-12-29 PROCEDURE — 99232 SBSQ HOSP IP/OBS MODERATE 35: CPT | Performed by: INTERNAL MEDICINE

## 2020-12-29 PROCEDURE — 0241U HB NFCT DS VIR RESP RNA 4 TRGT: CPT | Performed by: INTERNAL MEDICINE

## 2020-12-29 RX ORDER — NYSTATIN 100000 [USP'U]/G
POWDER TOPICAL 2 TIMES DAILY
Status: DISCONTINUED | OUTPATIENT
Start: 2020-12-29 | End: 2020-12-30 | Stop reason: HOSPADM

## 2020-12-29 RX ORDER — CEPHALEXIN 500 MG/1
500 CAPSULE ORAL EVERY 8 HOURS SCHEDULED
Status: DISCONTINUED | OUTPATIENT
Start: 2020-12-29 | End: 2020-12-30 | Stop reason: HOSPADM

## 2020-12-29 RX ADMIN — CEPHALEXIN 500 MG: 500 CAPSULE ORAL at 13:50

## 2020-12-29 RX ADMIN — DEXAMETHASONE SODIUM PHOSPHATE 4 MG: 4 INJECTION, SOLUTION INTRA-ARTICULAR; INTRALESIONAL; INTRAMUSCULAR; INTRAVENOUS; SOFT TISSUE at 09:45

## 2020-12-29 RX ADMIN — NYSTATIN: 100000 POWDER TOPICAL at 21:24

## 2020-12-29 RX ADMIN — SODIUM CHLORIDE, SODIUM GLUCONATE, SODIUM ACETATE, POTASSIUM CHLORIDE, MAGNESIUM CHLORIDE, SODIUM PHOSPHATE, DIBASIC, AND POTASSIUM PHOSPHATE 75 ML/HR: .53; .5; .37; .037; .03; .012; .00082 INJECTION, SOLUTION INTRAVENOUS at 00:33

## 2020-12-29 RX ADMIN — NYSTATIN: 100000 POWDER TOPICAL at 09:46

## 2020-12-29 RX ADMIN — DEXAMETHASONE SODIUM PHOSPHATE 4 MG: 4 INJECTION, SOLUTION INTRA-ARTICULAR; INTRALESIONAL; INTRAMUSCULAR; INTRAVENOUS; SOFT TISSUE at 21:21

## 2020-12-29 RX ADMIN — ENOXAPARIN SODIUM 40 MG: 40 INJECTION SUBCUTANEOUS at 09:45

## 2020-12-29 RX ADMIN — CEPHALEXIN 500 MG: 500 CAPSULE ORAL at 21:22

## 2020-12-29 NOTE — ASSESSMENT & PLAN NOTE
Difficulty ambulating at home and performing ADLs  This is also potentially exacerbated by acute infection  Patient's family requesting that she be placed in nursing home given her inability to care for herself  Currently uses walker at home but is unable to pull herself up to even ambulate        Plan:  · PT/OT evaluation and treat  · Case management consulted for disposition planning: patient will be going to [facility] tomorrow

## 2020-12-29 NOTE — ASSESSMENT & PLAN NOTE
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

## 2020-12-29 NOTE — PROGRESS NOTES
Progress Note Maryam Martin 10/21/1930, 80 y o  female MRN: [id number]    Unit/Bed#: S -Andrea Encounter: [id number]    Primary Care Provider: Gertrudis Winchester MD   Date and time admitted to hospital: 12/27/2020 12:52 PM        * Acute pyelonephritis  Assessment & Plan  Results from last 7 days   Lab Units 12/27/20  1415   LEUKOCYTES UA  Moderate*   NITRITE UA  Negative   GLUCOSE UA mg/dl 250 (1/4%)*   KETONES UA mg/dl Negative   BLOOD UA  Large*   WBC UA /hpf Innumerable*   RBC UA /hpf 10-20*   BACTERIA UA /hpf Moderate*     Recent Labs     12/27/20  1309 12/28/20  0647 12/29/20  0559   WBC 14 31* 9 48 9 04       · Ct Abdomen Pelvis With Contrast:  New 3 1 x 3 5 cm hypodense area upper pole right kidney suggest focal pyelonephritis  Follow-up suggested at 3 months to demonstrate resolution  UA done in the ED showed moderate bacteria  Cholelithiasis The CBD that the upper limits measuring about 9 mm  The CBD remains stable in size previous study of December 5, 2020 with no new intrahepatic ductal dilation   · Currently asymptomatic  Plan:  · Follow urine cultures  · Transition to oral keflex   · Monitor intake and output closely    Ambulatory dysfunction  Assessment & Plan  Difficulty ambulating at home and performing ADLs  This is also potentially exacerbated by acute infection  Patient's family requesting that she be placed in nursing home given her inability to care for herself  Currently uses walker at home but is unable to pull herself up to even ambulate  Plan:  · PT/OT evaluation and treat  · Case management consulted for disposition planning: patient will be going to [facility] tomorrow    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  Assessment & Plan  Ct Head Without Contrast    Result Date: 12/27/2020  Impression: 1  No acute intracranial abnormality  Microangiopathic changes  2   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   Workstation performed: [id number]    Originally seen on past admission  Patient was discharged on Decadron tapering dose however patient was noncompliant with medication  Patient also would not want further treatment for management of mass  No sign of malignancy seen on chest abdomen or pelvis  Plan:  · Given vasogenic edema on CT, currently on maintenance dose of 4 mg IV Decadron q12, informal consultation to neurosurgery at [place] patient can go with steroids in tapering dose for two weeks      Hyperglycemia  Assessment & Plan  Likely in the setting of steroid therapy    Plan  Continue monitoring      Anemia  Assessment & Plan  hgb 11 3 hct 33 8    Plan  Continue monitoring      History of 2019 novel coronavirus disease (COVID-19)  Assessment & Plan  Tested positive for COVID-19 on 12/04/2020  Currently asymptomatic  Plan:  · Will not retest at this point given the fact that she recently tested positive; may need repeat test prior to discharge depending on disposition  · Closely monitor SpO2 and for other symptoms    Hyponatremia  Assessment & Plan  Recent Labs     12/27/20  1309 12/28/20  0527 12/29/20  0542   SODIUM 135* 135* 135*      Likely secondary to poor p o  Intake at home   asymptomatic    Plan:  · Continue monitoring    Leukocytosisresolved as of 12/29/2020  Assessment & Plan  · Leukocytes 14 31 likely in the setting of urinary tract infection, steroid therapy can also be contributory  · Currently wnl      Plan  Continue monitoring  Continue antibiotic therapy        VTE Pharmacologic Prophylaxis:   Pharmacologic: Enoxaparin (Lovenox)  Mechanical VTE Prophylaxis in Place: Yes    Discussions with Specialists or Other Care Team Provider: attending physician, Neurosurgery    Education and Discussions with Family / Patient: Patient has been fully educated about her current diagnosis and treatment plan, family will be updated    Current Length of Stay: 2 day(s)    Current Patient Status: Inpatient     Discharge Plan / Estimated Discharge Date: Likely in 24 hrs    Code Status: Level 3 - DNAR and DNI      Subjective:   Patient mentioned she feels ok, denied any chest pain, sob, urinary symptoms, nausea, vomit     Objective:     Vitals:   Temp (24hrs), Av 1 °F (36 7 °C), Min:97 8 °F (36 6 °C), Max:98 6 °F (37 °C)    Temp:  [97 8 °F (36 6 °C)-98 6 °F (37 °C)] 97 8 °F (36 6 °C)  HR:  [61-69] 62  Resp:  [18] 18  BP: (139-160)/(77-78) 160/77  SpO2:  [94 %-97 %] 94 %  Body mass index is 37 42 kg/m²  Input and Output Summary (last 24 hours): Intake/Output Summary (Last 24 hours) at 2020 1303  Last data filed at 2020 1033  Gross per 24 hour   Intake 240 ml   Output 683 ml   Net -443 ml       Physical Exam:     Physical Exam  Vitals signs and nursing note reviewed  Constitutional:       General: She is not in acute distress  Appearance: Normal appearance  She is obese  She is not toxic-appearing  HENT:      Head: Normocephalic and atraumatic  Right Ear: Tympanic membrane normal       Left Ear: Tympanic membrane normal       Nose: Nose normal  No congestion or rhinorrhea  Mouth/Throat:      Mouth: Mucous membranes are moist       Pharynx: Oropharynx is clear  No oropharyngeal exudate or posterior oropharyngeal erythema  Eyes:      Extraocular Movements: Extraocular movements intact  Conjunctiva/sclera: Conjunctivae normal       Pupils: Pupils are equal, round, and reactive to light  Neck:      Musculoskeletal: Normal range of motion and neck supple  Cardiovascular:      Rate and Rhythm: Normal rate  Pulses: Normal pulses  Heart sounds: No murmur  No gallop  Pulmonary:      Effort: Pulmonary effort is normal  No respiratory distress  Breath sounds: Normal breath sounds  No wheezing or rales  Chest:      Chest wall: No tenderness  Abdominal:      General: Bowel sounds are normal  There is no distension  Palpations: Abdomen is soft  Tenderness:  There is no abdominal tenderness  Musculoskeletal: Normal range of motion  General: No swelling or tenderness  Skin:     General: Skin is warm  Capillary Refill: Capillary refill takes 2 to 3 seconds  Neurological:      Mental Status: She is alert and oriented to person, place, and time  Psychiatric:         Mood and Affect: Mood normal          Behavior: Behavior normal          Thought Content: Thought content normal          Judgment: Judgment normal          Additional Data:     Labs:    Results from last 7 days   Lab Units 12/29/20  0559 12/28/20  0647   WBC Thousand/uL 9 04 9 48   HEMOGLOBIN g/dL 11 3* 11 3*   HEMATOCRIT % 34 1* 33 8*   PLATELETS Thousands/uL 85* 72*   NEUTROS PCT %  --  93*   LYMPHS PCT %  --  3*   LYMPHO PCT % 5*  --    MONOS PCT %  --  3*   MONO PCT % 3*  --    EOS PCT % 0 0     Results from last 7 days   Lab Units 12/29/20  0542 12/28/20  0527   POTASSIUM mmol/L 4 2 4 1   CHLORIDE mmol/L 102 101   CO2 mmol/L 26 26   BUN mg/dL 20 22   CREATININE mg/dL 0 70 0 64   CALCIUM mg/dL 8 1* 7 6*   ALK PHOS U/L  --  62   ALT U/L  --  44   AST U/L  --  18           * I Have Reviewed All Lab Data Listed Above  * Additional Pertinent Lab Tests Reviewed:  [facility] 66 Admission Reviewed    Imaging:    Imaging Reports Reviewed Today Include: none  Imaging Personally Reviewed by Myself Includes: none    Recent Cultures (last 7 days):     Results from last 7 days   Lab Units 12/27/20  1415   URINE CULTURE  >100,000 cfu/ml        Last 24 Hours Medication List:   Current Facility-Administered Medications   Medication Dose Route Frequency Provider Last Rate    acetaminophen  650 mg Oral Q6H PRN Dex Browne MD      cefTRIAXone  1,000 mg Intravenous Q24H Dex Browne MD 1,000 mg (12/28/20 1611)    dexamethasone  4 mg Intravenous Q12H Sharad Barraza MD      enoxaparin  40 mg Subcutaneous Daily Dex Browne MD      multi-electrolyte  75 mL/hr Intravenous Continuous Rashidondnancy Wilson Savanna Ziegler MD 75 mL/hr (12/29/20 0033)    nystatin   Topical BID Luke Quinones MD      ondansetron  4 mg Intravenous Q6H PRN Job Odonnell MD          Today, Patient Was Seen By: Fercho Tipton MD    ** Please Note: This note has been constructed using a voice recognition system   **

## 2020-12-29 NOTE — ASSESSMENT & PLAN NOTE
· Leukocytes 14 31 likely in the setting of urinary tract infection, steroid therapy can also be contributory  · Currently wnl      Plan  Continue monitoring  Continue antibiotic therapy

## 2020-12-29 NOTE — ASSESSMENT & PLAN NOTE
Results from last 7 days   Lab Units 12/27/20  1415   LEUKOCYTES UA  Moderate*   NITRITE UA  Negative   GLUCOSE UA mg/dl 250 (1/4%)*   KETONES UA mg/dl Negative   BLOOD UA  Large*   WBC UA /hpf Innumerable*   RBC UA /hpf 10-20*   BACTERIA UA /hpf Moderate*     Recent Labs     12/27/20  1309 12/28/20  0647 12/29/20  0559   WBC 14 31* 9 48 9 04       · Ct Abdomen Pelvis With Contrast:  New 3 1 x 3 5 cm hypodense area upper pole right kidney suggest focal pyelonephritis  Follow-up suggested at 3 months to demonstrate resolution  UA done in the ED showed moderate bacteria  Cholelithiasis The CBD that the upper limits measuring about 9 mm  The CBD remains stable in size previous study of December 5, 2020 with no new intrahepatic ductal dilation   · Currently asymptomatic       Plan:  · Follow urine cultures  · Transition to oral keflex   · Monitor intake and output closely

## 2020-12-29 NOTE — DISCHARGE SUMMARY
Discharge- Anusha Lagunas 10/21/1930, 80 y o  female MRN: [id number]    Unit/Bed#: S -01 Encounter: [id number]    Primary Care Provider: Dominog Rucker MD   Date and time admitted to hospital: 12/27/2020 12:52 PM        * Acute pyelonephritis  Assessment & Plan  Results from last 7 days   Lab Units 12/27/20  1415   LEUKOCYTES UA  Moderate*   NITRITE UA  Negative   GLUCOSE UA mg/dl 250 (1/4%)*   KETONES UA mg/dl Negative   BLOOD UA  Large*   WBC UA /hpf Innumerable*   RBC UA /hpf 10-20*   BACTERIA UA /hpf Moderate*     Recent Labs     12/27/20  1309 12/28/20  0647 12/29/20  0559   WBC 14 31* 9 48 9 04       · Ct Abdomen Pelvis With Contrast:  New 3 1 x 3 5 cm hypodense area upper pole right kidney suggest focal pyelonephritis  Follow-up suggested at 3 months to demonstrate resolution  UA done in the ED showed moderate bacteria  Cholelithiasis The CBD that the upper limits measuring about 9 mm  The CBD remains stable in size previous study of December 5, 2020 with no new intrahepatic ductal dilation   · Currently asymptomatic  Plan:    · Transition to oral keflex , continue for 10 more days  · Continue follow up with pcp    Ambulatory dysfunction  Assessment & Plan  Difficulty ambulating at home and performing ADLs  This is also potentially exacerbated by acute infection  Patient's family requesting that she be placed in nursing home given her inability to care for herself  Currently uses walker at home but is unable to pull herself up to even ambulate  Plan:  · PT/OT evaluation and treat  · Case management consulted for disposition planning: patient will be going to 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  Assessment & Plan  Ct Head Without Contrast    Result Date: 12/27/2020  Impression: 1  No acute intracranial abnormality  Microangiopathic changes  2   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   Workstation performed: [id number]    Originally seen on past admission  Patient was discharged on Decadron tapering dose however patient was noncompliant with medication  Patient also would not want further treatment for management of mass  No sign of malignancy seen on chest abdomen or pelvis  Plan:  · Given vasogenic edema on CT, currently on maintenance dose of 4 mg IV Decadron q12, informal consultation to neurosurgery at [place] patient can go with steroids in tapering dose for two weeks as follow: decadron 1 tab ( 4mg total) q6h for 3d, 1 tab ( 2mg ) q6h for 3d, 1 tab ( 2mg) q8h for 3d, 1 tab ( 2mg) q12h for 3 days, 1 tab ( 2mg) Qd for 3 days for a total of 15 days       Hyperglycemia  Assessment & Plan  Likely in the setting of steroid therapy    Plan  Continue follow up with pcp      Anemia  Assessment & Plan  hgb 11 3 hct 33 8    Plan  Continue follow up with pcp      History of 2019 novel coronavirus disease (COVID-19)  Assessment & Plan  Tested positive for COVID-19 on 12/04/2020  Currently asymptomatic  Covid 19 virus detected in the setting of previous COVID + test as evidenced by new covid test postive, treated with monitoring respiratory status in this asymptomatic patient and adjust STR search through case management  Plan:  · Will not retest at this point given the fact that she recently tested positive; may need repeat test prior to discharge depending on disposition  · Closely monitor SpO2 and for other symptoms    Hyponatremia  Assessment & Plan  Recent Labs     12/28/20  0527 12/29/20  0542 12/30/20  0447   SODIUM 135* 135* 135*      Likely secondary to poor p o  Intake at home   asymptomatic    Plan:  · Continue follow up with pcp      Discharging Resident Physician: Eusebia Duque MD  Attending: Isak Rivera MD  PCP: Sandee Rico MD  Admission Date: 12/27/2020  Discharge Date: 12/30/20    Disposition:     [address] at [facility]    Reason for Admission:  Pyelonephritis    Consultations During Hospital Stay:  · None    Procedures Performed:     · None    Significant Findings / Test Results:     Ct Abdomen Pelvis With Contrast:  New 3 1 x 3 5 cm hypodense area upper pole right kidney suggest focal pyelonephritis  Follow-up suggested at 3 months to demonstrate resolution  UA done in the ED showed moderate bacteria  Cholelithiasis The CBD that the upper limits measuring about 9 mm  The CBD remains stable in size previous study of December 5, 2020 with no new intrahepatic ductal dilation     Incidental Findings:    Finding: Xray foot: Small avulsion injury adjacent to the 1st cuneiform appears              Test Results Pending at Discharge (will require follow up):   · none     Outpatient Tests Requested:  · none    Complications:  none    Hospital Course: Sole Hutchins is a 80 y o  female patient who originally presented to the hospital on 12/27/2020 due to Generalized weakness and falls  Patient was recently discharged home after diagnosing with cerebellar mass with vasogenice edema  She was prescribed tapering dose of dexamethasone , however she did not take the medications  She has been having repeated falls and lives alone  Patient is unable to take care of herself, family is also unable to help her  They wish to have her placed in a rehab facility  Apparently she recently lost her , has been depressed , refusing oral intake, UA was performed, showed moderate leukocytes  Moderate bacteria , RBC 10-20, Ct abdomen and pelvis: Hypodense area upper pole right kidney suggest focal pyelonephritis, patient was initiated on antibiotic therapy IV Rocephin, with adequate clinical response, Decadron was also resumed    During her hospital stay patient remained  Hemodynamically stable, adequate respiratory pattern, afebrile, covid 19 virus detected in the setting of previous covid + test ( 12/04/2020)  As evidenced by new covid test positive, treated with monitoring respiratory status in this asymptomatic patient and adjust STR search through case management  Patient stable from medicine standpoint for discharge to [facility] facility with antibiotic Therapy : Keflex for 10 more days and follow up with pcp, decadron in taper dose as described above for 15 days    Condition at Discharge: good     Discharge Day Visit / Exam:     Subjective:  Patient mentioned that she feels ok, denied any active chest pain, sob, nause, vomit, diarrhea  Vitals: Blood Pressure: 144/78 (12/30/20 0754)  Pulse: (!) 51 (12/30/20 0754)  Temperature: (!) 96 7 °F (35 9 °C) (12/30/20 0754)  Temp Source: Oral (12/30/20 0754)  Respirations: 16 (12/30/20 0754)  Height: 5' 5" (165 1 cm) (12/27/20 1258)  Weight - Scale: 102 kg (224 lb 13 9 oz) (12/27/20 1258)  SpO2: 97 % (12/30/20 0754)  Exam:   Physical Exam  Vitals signs and nursing note reviewed  Constitutional:       General: She is not in acute distress  Appearance: Normal appearance  She is obese  She is not ill-appearing, toxic-appearing or diaphoretic  HENT:      Head: Normocephalic and atraumatic  Right Ear: Tympanic membrane normal       Left Ear: Tympanic membrane normal       Nose: Nose normal  No congestion or rhinorrhea  Mouth/Throat:      Mouth: Mucous membranes are moist       Pharynx: Oropharynx is clear  No oropharyngeal exudate or posterior oropharyngeal erythema  Eyes:      Extraocular Movements: Extraocular movements intact  Conjunctiva/sclera: Conjunctivae normal       Pupils: Pupils are equal, round, and reactive to light  Neck:      Musculoskeletal: Normal range of motion and neck supple  Cardiovascular:      Rate and Rhythm: Normal rate  Pulses: Normal pulses  Heart sounds: No murmur  No gallop  Pulmonary:      Effort: Pulmonary effort is normal  No respiratory distress  Breath sounds: Normal breath sounds  No wheezing or rales  Chest:      Chest wall: No tenderness     Abdominal:      General: Bowel sounds are normal  There is no distension  Palpations: Abdomen is soft  Tenderness: There is no abdominal tenderness  Musculoskeletal: Normal range of motion  General: No swelling or tenderness  Skin:     General: Skin is warm  Capillary Refill: Capillary refill takes 2 to 3 seconds  Neurological:      Mental Status: She is alert and oriented to person, place, and time  Psychiatric:         Mood and Affect: Mood normal          Behavior: Behavior normal          Thought Content: Thought content normal          Judgment: Judgment normal        Discussion with Family: Patients daughter has been updated daily about her clinical condition and treatment plan, all questions have been answered at the best of my ability  Discharge instructions/Information to patient and family:   See after visit summary for information provided to patient and family  Provisions for Follow-Up Care:  See after visit summary for information related to follow-up care and any pertinent home health orders  Planned Readmission: no     Discharge Medications:  See after visit summary for reconciled discharge medications provided to patient and family        ** Please Note: This note has been constructed using a voice recognition system **

## 2020-12-29 NOTE — ASSESSMENT & PLAN NOTE
Recent Labs     12/27/20  1309 12/28/20  0527 12/29/20  0542   SODIUM 135* 135* 135*      Likely secondary to poor p o  Intake at home   asymptomatic    Plan:  · Continue monitoring

## 2020-12-30 VITALS
DIASTOLIC BLOOD PRESSURE: 78 MMHG | RESPIRATION RATE: 16 BRPM | HEART RATE: 51 BPM | WEIGHT: 224.87 LBS | SYSTOLIC BLOOD PRESSURE: 144 MMHG | BODY MASS INDEX: 37.47 KG/M2 | HEIGHT: 65 IN | OXYGEN SATURATION: 97 % | TEMPERATURE: 96.7 F

## 2020-12-30 LAB
ANION GAP SERPL CALCULATED.3IONS-SCNC: 6 MMOL/L (ref 4–13)
BUN SERPL-MCNC: 17 MG/DL (ref 5–25)
CALCIUM SERPL-MCNC: 8.3 MG/DL (ref 8.3–10.1)
CHLORIDE SERPL-SCNC: 101 MMOL/L (ref 100–108)
CO2 SERPL-SCNC: 28 MMOL/L (ref 21–32)
CREAT SERPL-MCNC: 0.73 MG/DL (ref 0.6–1.3)
GFR SERPL CREATININE-BSD FRML MDRD: 73 ML/MIN/1.73SQ M
GLUCOSE SERPL-MCNC: 260 MG/DL (ref 65–140)
POTASSIUM SERPL-SCNC: 4.2 MMOL/L (ref 3.5–5.3)
SODIUM SERPL-SCNC: 135 MMOL/L (ref 136–145)

## 2020-12-30 PROCEDURE — 97163 PT EVAL HIGH COMPLEX 45 MIN: CPT

## 2020-12-30 PROCEDURE — 97116 GAIT TRAINING THERAPY: CPT

## 2020-12-30 PROCEDURE — 80048 BASIC METABOLIC PNL TOTAL CA: CPT | Performed by: INTERNAL MEDICINE

## 2020-12-30 PROCEDURE — 99239 HOSP IP/OBS DSCHRG MGMT >30: CPT | Performed by: INTERNAL MEDICINE

## 2020-12-30 RX ORDER — DEXAMETHASONE 4 MG/1
TABLET ORAL
Refills: 0
Start: 2020-12-30 | End: 2021-01-14

## 2020-12-30 RX ORDER — DEXAMETHASONE 4 MG/1
TABLET ORAL
Qty: 36 TABLET | Refills: 0
Start: 2020-12-30 | End: 2020-12-30 | Stop reason: SDUPTHER

## 2020-12-30 RX ORDER — CEPHALEXIN 500 MG/1
500 CAPSULE ORAL EVERY 8 HOURS SCHEDULED
Qty: 30 CAPSULE | Refills: 0
Start: 2020-12-30 | End: 2021-01-09

## 2020-12-30 RX ADMIN — CEPHALEXIN 500 MG: 500 CAPSULE ORAL at 05:34

## 2020-12-30 RX ADMIN — ENOXAPARIN SODIUM 40 MG: 40 INJECTION SUBCUTANEOUS at 10:09

## 2020-12-30 RX ADMIN — NYSTATIN: 100000 POWDER TOPICAL at 10:10

## 2020-12-30 RX ADMIN — DEXAMETHASONE SODIUM PHOSPHATE 4 MG: 4 INJECTION, SOLUTION INTRA-ARTICULAR; INTRALESIONAL; INTRAMUSCULAR; INTRAVENOUS; SOFT TISSUE at 10:09

## 2020-12-30 NOTE — ASSESSMENT & PLAN NOTE
Tested positive for COVID-19 on 12/04/2020  Currently asymptomatic     Covid 19 virus detected in the setting of previous COVID + test as evidenced by new covid test postive, treated with monitoring respiratory status in this asymptomatic patient and adjust STR search through case management  Plan:  · Will not retest at this point given the fact that she recently tested positive; may need repeat test prior to discharge depending on disposition  · Closely monitor SpO2 and for other symptoms

## 2020-12-30 NOTE — QUICK NOTE
[facility] Hospitalist Service Attending Physician Attestation Note - Discharge    I have seen and examined [facility] personally and have reviewed the medical record independently  I have reviewed the case with the resident physician including all assessments and the plan of care for each  I agree with the resident physician and offer the following addendum to the below statements by the resident physician:     Date Evaluated:  12/30/20    Patient remotely evaluated  No acute overnight events reported  She remains stable without any new complaints  She is nontoxic appearing on exam, respirations are nonlabored, no accessory muscle use  The patient remains clinically stable for discharge to rehab today  Discharge care plan is as documented below  Discharge Statement:  I spent >30 minutes discharging the patient  This time was spent on the day of discharge  I had direct contact with the patient on the day of discharge  Greater than 50% of the total time was spent examining patient, answering all patient questions, arranging and discussing plan of care with patient as well as directly providing post-discharge instructions  Additional time then spent on discharge activities  For detailed history, assessment, and plan of care, please review the statements below by the resident physician

## 2020-12-30 NOTE — CASE MANAGEMENT
CM informed by [facility] that Patient is medically stable for transfer to rehab  CM spoke with Patient's daughter, Bing Casiano, via phone, to update on dc plan to [place] and explain the OOP cost of [facility] transportation  Pat verbalized her understanding of the cost and is agreeable to same  IMM reviewed with patient's caregiver  patient's caregiver agrees with discharge determination  CM sent referral for [facility] transport to [facility] via [address]  CM dept  Will follow for confirmed transport time and update family

## 2020-12-30 NOTE — DISCHARGE INSTR - AVS FIRST PAGE
Dear Tennis Brow,     It was our pleasure to care for you here at [facility]  It is our hope that we were always able to exceed the expected standards for your care during your stay  You were hospitalized due to pyelonephritis   You were cared for on the second floor by Hedy Maza MD under the service of Jenna Guillen MD with the [facility] who covers for your primary care physician (PCP), Maxim Saha MD, while you were hospitalized  If you have any questions or concerns related to this hospitalization, you may contact us at [phone]  For follow up as well as any medication refills, we recommend that you follow up with your primary care physician  A registered nurse will reach out to you by phone within a few days after your discharge to answer any additional questions that you may have after going home  However, at this time we provide for you here, the most important instructions / recommendations at discharge:     · Notable Medication Adjustments -   · Keflex was initiated to help you with the urinary Tract Infection  · Decadron was resumed to help you with the brain swelling  · Testing Required after Discharge -   · none  · Important follow up information -   · Please follow up as instructed below  · Other Instructions -   · Please continue keflex 500 mg q8h for 10 days  · Please continue oral hydration  · Please continue decadron 1 tab ( 4 mg total) q8h for 3 days then , 1 tab ( 2mg total) q 6h for 3 days, then 1 tab ( 2mg total) q8h for 3 days, then 1 tab (2mg total) q 12h for 3 days then 1 tab ( 2 mg total) daily for 3 days, for a total of 15 days  Please review this entire after visit summary as additional general instructions including medication list, appointments, activity, diet, any pertinent wound care, and other additional recommendations from your care team that may be provided for you        Sincerely,     Hedy Maza, MD

## 2020-12-30 NOTE — ASSESSMENT & PLAN NOTE
Difficulty ambulating at home and performing ADLs  This is also potentially exacerbated by acute infection  Patient's family requesting that she be placed in nursing home given her inability to care for herself  Currently uses walker at home but is unable to pull herself up to even ambulate        Plan:  · PT/OT evaluation and treat  · Case management consulted for disposition planning: patient will be going to [facility] today

## 2020-12-30 NOTE — ASSESSMENT & PLAN NOTE
Recent Labs     12/28/20  0527 12/29/20  0542 12/30/20  0447   SODIUM 135* 135* 135*      Likely secondary to poor p o  Intake at home   asymptomatic    Plan:  · Continue follow up with pcp

## 2020-12-30 NOTE — CASE MANAGEMENT
CM informed by Lacy Munoz at [facility] that transportation is scheduled for 1300 via [place]  CM spoke to Patient's daughter, Mickeal Fall, via phone, to update on transport time  CM updated SLIM, RN, and Facility of transport time

## 2020-12-30 NOTE — PHYSICAL THERAPY NOTE
PHYSICAL THERAPY TREATMENT NOTE    Patient Name: Lloyd Pace  QRBGA'Y Date: 12/30/2020 12/30/20 1034   PT Last Visit   PT Visit Date 12/30/20   Note Type   Note Type Treatment   Pain Assessment   Pain Assessment Tool Pain Assessment not indicated - pt denies pain   Restrictions/Precautions   Other Precautions Contact/isolation; Airborne/isolation; Impulsive; Chair Alarm; Bed Alarm; Fall Risk   General   Chart Reviewed Yes   Family/Caregiver Present No   Cognition   Arousal/Participation Cooperative   Attention Attends with cues to redirect   Orientation Level Oriented to person;Oriented to place; Other (Comment)  (pt was identified w/ full name, birth date)   Following Commands Follows one step commands with increased time or repetition   Subjective   Subjective pt agreed to PT intervention  mobility education was provided regarding transfer technique and roller walker use   education was completed via teachback, demonstration and verbal instruction  Bed Mobility   Sit to Supine 3  Moderate assistance  (pt requested return to bed due to fatigue)   Additional items Assist x 1;HOB elevated; Increased time required;Verbal cues;LE management  (for trunk/LE positioning)   Transfers   Sit to Stand 3  Moderate assistance   Additional items Assist x 1; Increased time required;Verbal cues  (for hand placement, LE positioning)   Stand to Sit 3  Moderate assistance   Additional items Assist x 1;Verbal cues  (for body positioning, hand placement, controlled descent)   Ambulation/Elevation   Gait pattern Decreased foot clearance; Short stride; Excessively slow; Foward flexed   Gait Assistance 3  Moderate assist   Additional items Assist x 1;Verbal cues; Tactile cues  (for walker positioning, full step length)   Assistive Device Rolling walker   Distance 5 feet x2 w/ seated rest break x 3 minutes  (additoinal not possible due to fatigue)   Balance Static Sitting Fair   Static Standing Poor   Ambulatory Poor  (w/ roller walker)   Activity Tolerance   Activity Tolerance Patient limited by fatigue   [facility] Made [facility]   Assessment   Prognosis Fair   Problem List Decreased strength;Decreased endurance; Impaired balance;Decreased mobility; Decreased coordination;Decreased safety awareness   Assessment Therapist provided education to pt for mobility technique including transfers and gait w/ roller walker  Education was provided due to findings from evaluation  Frequent repetition was needed for carryover to be noted  Pt was found to have improvement after education w/ decreased level of assist to maintain safety and increased ambulation distance  Pt continues to be a fall risk  continued inpatient PT tx is indicated to reduce fall risk factors  Goals   Patient Goals go home   STG Expiration Date 01/09/20   Short Term Goal #1 pt will: Increase bilateral LE strength 1/2 grade to facilitate independent mobility, Perform all bed mobility tasks w/ supervision to decrease fall risk factors, Perform all transfers w/ minx1 to improve independence, Ambulate 120 ft  with roller walker w/ minx1 w/o LOB to expedite safe return home, Increase all balance 1 grade to decrease risk for falls, Complete exercise program independently to improve strength and endurance, Tolerate 3 hr OOB to faciliate upright tolerance, Improve Barthel Index score to 70 or greater to facilitate independence and Increase 30 second chair stand test score to 4 or greater to decrease risk for falls  PT to see when stair training is appropriate  PT Treatment Day 1   Plan   Treatment/Interventions Functional transfer training;LE strengthening/ROM; Therapeutic exercise; Endurance training;Cognitive reorientation;Patient/family training;Equipment eval/education; Bed mobility;Gait training  (PT to see when stair training is appropriate )   Progress Progressing toward goals   PT Frequency Other (Comment)  (3 to 5x/week)   Recommendation   PT Discharge Recommendation Post-Acute Rehabilitation Services   Equipment Recommended Walker   Additional Comments Pt would benefit from Gerontology consult to address cognition and safety awareness  Skilled inpatient PT recommended while in hospital to progress pt toward treatment goals      Argenis Elena, PT

## 2020-12-30 NOTE — ASSESSMENT & PLAN NOTE
Ct Head Without Contrast    Result Date: 12/27/2020  Impression: 1  No acute intracranial abnormality  Microangiopathic changes  2   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  Workstation performed: [id number]    Originally seen on past admission  Patient was discharged on Decadron tapering dose however patient was noncompliant with medication  Patient also would not want further treatment for management of mass  No sign of malignancy seen on chest abdomen or pelvis      Plan:  · Given vasogenic edema on CT, currently on maintenance dose of 4 mg IV Decadron q12, informal consultation to neurosurgery at [place] patient can go with steroids in tapering dose for two weeks as follow: decadron 1 tab ( 4mg total) q6h for 3d, 1 tab ( 2mg ) q6h for 3d, 1 tab ( 2mg) q8h for 3d, 1 tab ( 2mg) q12h for 3 days, 1 tab ( 2mg) Qd for 3 days for a total of 15 days

## 2020-12-30 NOTE — DISCHARGE INSTRUCTIONS
Urinary Tract Infection in Women   WHAT YOU NEED TO KNOW:   A urinary tract infection (UTI) is caused by bacteria that get inside your urinary tract  Most bacteria that enter your urinary tract come out when you urinate  If the bacteria stay in your urinary tract, you may get an infection  Your urinary tract includes your kidneys, ureters, bladder, and urethra  Urine is made in your kidneys, and it flows from the ureters to the bladder  Urine leaves the bladder through the urethra  A UTI is more common in your lower urinary tract, which includes your bladder and urethra  DISCHARGE INSTRUCTIONS:   Seek care immediately if:   · You are urinating very little or not at all  · You have a high fever with shaking chills  · You have side or back pain that gets worse  Call your doctor if:   · You have a fever  · You do not feel better after 2 days of taking antibiotics  · You are vomiting  · You have questions or concerns about your condition or care  Medicines:   · Antibiotics  help fight a bacterial infection  If you have UTIs often (called recurrent UTIs), you may be given antibiotics to take regularly  You will be given directions for when and how to use antibiotics  The goal is to prevent UTIs but not cause antibiotic resistance by using antibiotics too often  · Medicines  may be given to decrease pain and burning when you urinate  They will also help decrease the feeling that you need to urinate often  These medicines will make your urine orange or red  · Take your medicine as directed  Contact your healthcare provider if you think your medicine is not helping or if you have side effects  Tell him or her if you are allergic to any medicine  Keep a list of the medicines, vitamins, and herbs you take  Include the amounts, and when and why you take them  Bring the list or the pill bottles to follow-up visits  Carry your medicine list with you in case of an emergency      Follow up with your healthcare provider as directed:  Write down your questions so you remember to ask them during your visits  Prevent another UTI:   · Empty your bladder often  Urinate and empty your bladder as soon as you feel the need  Do not hold your urine for long periods of time  · Wipe from front to back after you urinate or have a bowel movement  This will help prevent germs from getting into your urinary tract through your urethra  · Drink liquids as directed  Ask how much liquid to drink each day and which liquids are best for you  You may need to drink more liquids than usual to help flush out the bacteria  Do not drink alcohol, caffeine, or citrus juices  These can irritate your bladder and increase your symptoms  Your healthcare provider may recommend cranberry juice to help prevent a UTI  · Urinate after you have sex  This can help flush out bacteria passed during sex  · Do not douche or use feminine deodorants  These can change the chemical balance in your vagina  · Change sanitary pads or tampons often  This will help prevent germs from getting into your urinary tract  · Talk to your healthcare provider about your birth control method  You may need to change your method if it is increasing your risk for UTIs  · Wear cotton underwear and clothes that are loose  Tight pants and nylon underwear can trap moisture and cause bacteria to grow  · Vaginal estrogen may be recommended  This medicine helps prevent UTIs in women who have gone through menopause or are in marni-menopause  · Do pelvic muscle exercises often  Pelvic muscle exercises may help you start and stop urinating  Strong pelvic muscles may help you empty your bladder easier  Squeeze these muscles tightly for 5 seconds like you are trying to hold back urine  Then relax for 5 seconds  Gradually work up to squeezing for 10 seconds  Do 3 sets of 15 repetitions a day, or as directed      © [employer] 2020 Information is for End User's use only and may not be sold, redistributed or otherwise used for commercial purposes  All illustrations and images included in CareNotes® are the copyrighted property of [employer]  or [employer]  The above information is an  only  It is not intended as medical advice for individual conditions or treatments  Talk to your doctor, nurse or pharmacist before following any medical regimen to see if it is safe and effective for you

## 2020-12-30 NOTE — PHYSICAL THERAPY NOTE
PHYSICAL THERAPY EVALUATION NOTE    Patient Name: Sole Hutchins  OLBGF'K Date: 12/30/2020  AGE:   80 y o  Mrn:   [id number]  ADMIT DX:  Weakness [R53 1]  Pyelonephritis [N12]    Past Medical History:   Diagnosis Date    Brain mass      Length Of Stay: 3  PHYSICAL THERAPY EVALUATION :    12/30/20 1024   PT Last Visit   PT Visit Date 12/30/20   Note Type   Note type Evaluation   Pain Assessment   Pain Assessment Tool Pain Assessment not indicated - pt denies pain   Home Living   Type of [address] [place] [address] Two level; Able to live on main level with bedroom/bathroom; Other (Comment)  (3 MANOL[address])   Additional Comments lives alone  ambulates w/ roller walker  independent w/ ADLs  family assist w/ transportation and driving  2 falls in last 6 months  Prior Function   Comments pt seen supine in bed  agreed to PT eval  denied pain or dizziness  reports feeling weak  input as needed for task focus  Restrictions/Precautions   Other Precautions Contact/isolation; Airborne/isolation; Impulsive; Chair Alarm; Bed Alarm; Fall Risk   General   Additional Pertinent History 12/30/20 at 4:47, glucose was 260  Family/Caregiver Present No   Cognition   Arousal/Participation Cooperative   Orientation Level Oriented to person;Oriented to place; Other (Comment)  (pt was identified w/ full name, birth date)   Following Commands Follows one step commands with increased time or repetition   RUE Assessment   RUE Assessment WFL  (3+/5, shoulder 3/5)   LUE Assessment   LUE Assessment WFL  (3+/5, shoulder 3/5)   RLE Assessment   RLE Assessment WFL  (3+/5)   LLE Assessment   LLE Assessment WFL  (3+/5)   Coordination   Movements are Fluid and Coordinated 0   Coordination and Movement Description impaired coordination all extremities   Light Touch   RLE Light Touch Grossly intact   LLE Light Touch Grossly intact   Bed Mobility   Supine to Sit 3  Moderate assistance  (+ lightheaded x 1 minute)   Additional items Assist x 1; Increased time required;LE management  (trunk/LE positioning)   Additional Comments 30 second chair stand test: 0 (as pt is unable to stand w/o use of UEs)  Transfers   Sit to Stand 2  Maximal assistance   Additional items Assist x 1; Increased time required   Stand to Sit 3  Moderate assistance   Additional items Assist x 1; Impulsive  (poorly controlled descent to edge of bed)   Ambulation/Elevation   Gait pattern Forward Flexion;Narrow LONA; Decreased foot clearance; Short stride; Excessively slow   Gait Assistance 2  Maximal assist   Additional items Assist x 1   Assistive Device Rolling walker   Distance 3 feet  (additional not possible due to [id number])   Stair Management Assistance Not tested  (due to limited ambulation tolerance, safety awareness)   Balance   Static Sitting Fair   Static Standing Poor   Ambulatory Poor -  (w/ roller walker)   Activity Tolerance   Activity Tolerance Patient limited by fatigue   Nurse Made Aware spoke to Apparent [facility]    Assessment   Prognosis Fair   Problem List Decreased strength;Decreased endurance; Impaired balance;Decreased mobility; Decreased coordination;Decreased safety awareness   Assessment Pt presents with generalized weakness and inability to pull herself up as well as recent falls  Dx: acute pyelonephritis, ambulatory dysfunction, cerebellar mass, hyperglycemia, hyponatremia, and COVID-19  order placed for PT eval and tx, w/ activity order of up w/ A  pt presents w/ comorbidities of brain mass and COVID-19 and personal factors of advanced age, living in 2 story house, mobilizing w/ assistive device, stair(s) to enter home, limited home support, positive fall history and readmission to hospital  pt presents w/ weakness, decreased endurance, impaired balance, gait deviations, impaired coordination, decreased safety awareness and fall risk   these impairments are evident in findings from physical examination (weakness and impaired coordination), mobility assessment (need for mod to max assist w/ all phases of mobility when usually mobilizing independently, tolerance to only 3 feet of ambulation and need for cueing for mobility technique), and Barthel Index: 45/100 and 30 second chair stand test: 0 (less than 4 indicates fall risk in females 80to 80years old)  pt needed input for task focus and mobility technique/safety  pt is at risk for falls due to physical and safety awareness deficits  pt's clinical presentation is unstable/unpredictable (evident in poor blood sugar control, need for assist w/ all phases of mobility when usually mobilizing independently, tolerance to only 3 feet of ambulation and need for input for task focus and mobility technique)  pt needs inpatient PT tx to improve mobility deficits and progress mobility training as appropriate  discharge recommendation is for inpatient rehab to reduce fall risk and maximize level of functional independence  Pt would benefit from Gerontology consult to address cognition and safety awareness  Goals   Patient Goals go home   STG Expiration Date 01/09/20   Short Term Goal #1 pt will: Increase bilateral LE strength 1/2 grade to facilitate independent mobility, Perform all bed mobility tasks w/ supervision to decrease fall risk factors, Perform all transfers w/ minx1 to improve independence, Ambulate 120 ft  with roller walker w/ minx1 w/o LOB to expedite safe return home, Increase all balance 1 grade to decrease risk for falls, Complete exercise program independently to improve strength and endurance, Tolerate 3 hr OOB to faciliate upright tolerance, Improve Barthel Index score to 70 or greater to facilitate independence and Increase 30 second chair stand test score to 4 or greater to decrease risk for falls  PT to see when stair training is appropriate  Plan   Treatment/Interventions Functional transfer training;LE strengthening/ROM; Therapeutic exercise; Endurance training;Cognitive reorientation;Patient/family training;Equipment eval/education; Bed mobility;Gait training  (PT to see when stair training is appropriate )   PT Frequency Other (Comment)  (3 to 5x/week)   Recommendation   PT Discharge Recommendation Post-Acute Rehabilitation Services   Equipment Recommended Walker   Additional Comments Pt would benefit from Gerontology consult to address cognition and safety awareness  Barthel Index   Feeding 10   Bathing 0   Grooming Score 0   Dressing Score 0   Bladder Score 10   Bowels Score 10   Toilet Use Score 5   Transfers (Bed/Chair) Score 10   Mobility (Level Surface) Score 0   Stairs Score 0   Barthel Index Score 45     30 second chair stand test: 0 (less than 4 indicates fall risk in females 80to 80years old)  Skilled PT recommended while in hospital and upon DC to progress pt toward treatment goals       Alexi Hsu, PT

## 2020-12-30 NOTE — ASSESSMENT & PLAN NOTE
Results from last 7 days   Lab Units 12/27/20  1415   LEUKOCYTES UA  Moderate*   NITRITE UA  Negative   GLUCOSE UA mg/dl 250 (1/4%)*   KETONES UA mg/dl Negative   BLOOD UA  Large*   WBC UA /hpf Innumerable*   RBC UA /hpf 10-20*   BACTERIA UA /hpf Moderate*     Recent Labs     12/27/20  1309 12/28/20  0647 12/29/20  0559   WBC 14 31* 9 48 9 04       · Ct Abdomen Pelvis With Contrast:  New 3 1 x 3 5 cm hypodense area upper pole right kidney suggest focal pyelonephritis  Follow-up suggested at 3 months to demonstrate resolution  UA done in the ED showed moderate bacteria  Cholelithiasis The CBD that the upper limits measuring about 9 mm  The CBD remains stable in size previous study of December 5, 2020 with no new intrahepatic ductal dilation   · Currently asymptomatic       Plan:    · Transition to oral keflex , continue for 10 more days  · Continue follow up with pcp

## 2020-12-30 NOTE — PLAN OF CARE
Problem: PHYSICAL THERAPY ADULT  Goal: Performs mobility at highest level of function for planned discharge setting  See evaluation for individualized goals  Description: Treatment/Interventions: Functional transfer training, LE strengthening/ROM, Therapeutic exercise, Endurance training, Cognitive reorientation, Patient/family training, Equipment eval/education, Bed mobility, Gait training(PT to see when stair training is appropriate )  Equipment Recommended: Sreedhar Orantes       See flowsheet documentation for full assessment, interventions and recommendations  Outcome: Progressing  Note: Prognosis: Fair  Problem List: Decreased strength, Decreased endurance, Impaired balance, Decreased mobility, Decreased coordination, Decreased safety awareness  Assessment: Therapist provided education to pt for mobility technique including transfers and gait w/ roller walker  Education was provided due to findings from evaluation  Frequent repetition was needed for carryover to be noted  Pt was found to have improvement after education w/ decreased level of assist to maintain safety and increased ambulation distance  Pt continues to be a fall risk  continued inpatient PT tx is indicated to reduce fall risk factors  PT Discharge Recommendation: Post-Acute Rehabilitation Services          See flowsheet documentation for full assessment

## 2024-02-02 NOTE — ED NOTES
Left Voicemail (1st Attempt) for the patient to call back and schedule the following:    Appointment type: [facility]  Provider: CHARLES  Return date: MAY 2024  Specialty phone number: [phone]  Additional appointment(s) needed: US  Additonal Notes: per checkout note.     Patient transported to [address] [place] [address] [address], RN  12/27/20 1205